# Patient Record
Sex: FEMALE | Race: ASIAN | NOT HISPANIC OR LATINO
[De-identification: names, ages, dates, MRNs, and addresses within clinical notes are randomized per-mention and may not be internally consistent; named-entity substitution may affect disease eponyms.]

---

## 2022-05-25 PROBLEM — Z00.00 ENCOUNTER FOR PREVENTIVE HEALTH EXAMINATION: Status: ACTIVE | Noted: 2022-05-25

## 2022-05-26 ENCOUNTER — APPOINTMENT (OUTPATIENT)
Dept: THORACIC SURGERY | Facility: CLINIC | Age: 62
End: 2022-05-26
Payer: MEDICAID

## 2022-05-26 ENCOUNTER — NON-APPOINTMENT (OUTPATIENT)
Age: 62
End: 2022-05-26

## 2022-05-26 VITALS
HEIGHT: 62 IN | HEART RATE: 60 BPM | BODY MASS INDEX: 21.16 KG/M2 | WEIGHT: 115 LBS | OXYGEN SATURATION: 98 % | RESPIRATION RATE: 16 BRPM | DIASTOLIC BLOOD PRESSURE: 78 MMHG | SYSTOLIC BLOOD PRESSURE: 134 MMHG

## 2022-05-26 DIAGNOSIS — Z80.0 FAMILY HISTORY OF MALIGNANT NEOPLASM OF DIGESTIVE ORGANS: ICD-10-CM

## 2022-05-26 PROCEDURE — 99205 OFFICE O/P NEW HI 60 MIN: CPT

## 2022-05-26 NOTE — HISTORY OF PRESENT ILLNESS
[FreeTextEntry1] : Ms. ALONSO HUITRON, 61 year old female, never a smoker, with no significant PMHX, who c/o middle chest pain radiated to right lateral chest since 09/2021, had CXR x2, findings were unremarkable. CT chest on 05/07/2022 showed anterior mediastinal mass. \par \par CT chest on 05/07/2022:\par - 4.0 x 2.8 x 4.1 cm (2: 60 and 6: 71) solid right anterior mediastinal soft tissue mass containing few punctate internal calcifications\par - calcified right hilar nodules present. \par - a 3 mm DOUG nodule (3: 74)\par - a 3 mm calcified granuloma in RUL (3: 26)\par - 4 mm calcified LL granuloma (3: 97)\par - a 4 mm RLL subpleural nodule (3: 104), tiny internal fat\par \par Patient is here today for CT surgery consultation, referred by Dr. Kb Solano (Pulm). Patient c/o middle chest pain radiated to right lateral chest since 09/2021, SOB on exertion, c/o vision changes. c/o ~ 10 lbs weight loss over 10 months period. Patient denies dysphagia, weakness, cough, chest pain, fever, chills, loss of appetite, weight loss, or hemoptysis.

## 2022-05-26 NOTE — ASSESSMENT
[FreeTextEntry1] : Ms. ALONSO HUITRON, 61 year old female, never a smoker, with no significant PMHX, who c/o middle chest pain radiated to right lateral chest since 09/2021, had CXR x2, findings were unremarkable. CT chest on 05/07/2022 showed anterior mediastinal mass. \par \par CT chest on 05/07/2022:\par - 4.0 x 2.8 x 4.1 cm (2: 60 and 6: 71) solid right anterior mediastinal soft tissue mass containing few punctate internal calcifications\par - calcified right hilar nodules present. \par - a 3 mm DOUG nodule (3: 74)\par - a 3 mm calcified granuloma in RUL (3: 26)\par - 4 mm calcified LL granuloma (3: 97)\par - a 4 mm RLL subpleural nodule (3: 104), tiny internal fat\par \par I have reviewed the patient's medical records and diagnostic images at time of this office consultation and have made the following recommendation:\par 1. CT chest reviewed and explained to patient, I recommended a Right VATS, RA, resection of anterior mediastinal mass on 06/21/2022. Risks and benefits and alternatives explained to patient, all questions answered, patient agreed to proceed with surgery.\par 2. CT chest w/ IV contrast\par 3. PET/CT\par 4. Obtain PFTs report from Dr. Solano. \par 5. Order Blood work to r/o MG. \par 6. Medical clearance and PST. \par \par \par I personally performed the services described in the documentation, reviewed the documentation recorded by the scribe in my presence and it accurately and completely records my words and actions.\par \par I, Sabrina Fernandez, NP, am scribing for and the presence of TAPAN Rios, the following sections HISTORY OF PRESENT ILLNESS, PAST MEDICAL/FAMILY/SOCIAL HISTORY; REVIEW OF SYSTEMS; VITAL SIGNS; PHYSICAL EXAM; DISPOSITION.

## 2022-05-26 NOTE — CONSULT LETTER
[Dear  ___] : Dear  [unfilled], [Consult Letter:] : I had the pleasure of evaluating your patient, [unfilled]. [( Thank you for referring [unfilled] for consultation for _____ )] : Thank you for referring [unfilled] for consultation for [unfilled] [Please see my note below.] : Please see my note below. [Consult Closing:] : Thank you very much for allowing me to participate in the care of this patient.  If you have any questions, please do not hesitate to contact me. [Sincerely,] : Sincerely, [FreeTextEntry2] : Dr. Kb Solano (Pulm/Ref)\par Dr. Basilia Richard (PCP) [FreeTextEntry3] : Chandler Mcqueen MD, MPH \par System Director of Thoracic Surgery \par Director of Comprehensive Lung and Foregut [place] \par Professor Cardiovascular & Thoracic Surgery \par [facility] [employer] at [facility]\par \par

## 2022-06-07 ENCOUNTER — OUTPATIENT (OUTPATIENT)
Dept: OUTPATIENT SERVICES | Facility: HOSPITAL | Age: 62
LOS: 1 days | End: 2022-06-07
Payer: COMMERCIAL

## 2022-06-07 VITALS
TEMPERATURE: 96 F | HEART RATE: 61 BPM | RESPIRATION RATE: 16 BRPM | OXYGEN SATURATION: 98 % | DIASTOLIC BLOOD PRESSURE: 85 MMHG | SYSTOLIC BLOOD PRESSURE: 134 MMHG | WEIGHT: 113.98 LBS | HEIGHT: 64 IN

## 2022-06-07 DIAGNOSIS — D49.89 NEOPLASM OF UNSPECIFIED BEHAVIOR OF OTHER SPECIFIED SITES: ICD-10-CM

## 2022-06-07 LAB
ANION GAP SERPL CALC-SCNC: 9 MMOL/L — SIGNIFICANT CHANGE UP (ref 7–14)
BLD GP AB SCN SERPL QL: NEGATIVE — SIGNIFICANT CHANGE UP
BUN SERPL-MCNC: 12 MG/DL — SIGNIFICANT CHANGE UP (ref 7–23)
CALCIUM SERPL-MCNC: 9.2 MG/DL — SIGNIFICANT CHANGE UP (ref 8.4–10.5)
CHLORIDE SERPL-SCNC: 103 MMOL/L — SIGNIFICANT CHANGE UP (ref 98–107)
CO2 SERPL-SCNC: 29 MMOL/L — SIGNIFICANT CHANGE UP (ref 22–31)
CREAT SERPL-MCNC: 0.64 MG/DL — SIGNIFICANT CHANGE UP (ref 0.5–1.3)
EGFR: 100 ML/MIN/1.73M2 — SIGNIFICANT CHANGE UP
GLUCOSE SERPL-MCNC: 91 MG/DL — SIGNIFICANT CHANGE UP (ref 70–99)
HCT VFR BLD CALC: 37.9 % — SIGNIFICANT CHANGE UP (ref 34.5–45)
HGB BLD-MCNC: 12.6 G/DL — SIGNIFICANT CHANGE UP (ref 11.5–15.5)
MCHC RBC-ENTMCNC: 31 PG — SIGNIFICANT CHANGE UP (ref 27–34)
MCHC RBC-ENTMCNC: 33.2 GM/DL — SIGNIFICANT CHANGE UP (ref 32–36)
MCV RBC AUTO: 93.3 FL — SIGNIFICANT CHANGE UP (ref 80–100)
NRBC # BLD: 0 /100 WBCS — SIGNIFICANT CHANGE UP
NRBC # FLD: 0 K/UL — SIGNIFICANT CHANGE UP
PLATELET # BLD AUTO: 215 K/UL — SIGNIFICANT CHANGE UP (ref 150–400)
POTASSIUM SERPL-MCNC: 4.2 MMOL/L — SIGNIFICANT CHANGE UP (ref 3.5–5.3)
POTASSIUM SERPL-SCNC: 4.2 MMOL/L — SIGNIFICANT CHANGE UP (ref 3.5–5.3)
RBC # BLD: 4.06 M/UL — SIGNIFICANT CHANGE UP (ref 3.8–5.2)
RBC # FLD: 11.8 % — SIGNIFICANT CHANGE UP (ref 10.3–14.5)
RH IG SCN BLD-IMP: POSITIVE — SIGNIFICANT CHANGE UP
SODIUM SERPL-SCNC: 141 MMOL/L — SIGNIFICANT CHANGE UP (ref 135–145)
WBC # BLD: 3.22 K/UL — LOW (ref 3.8–10.5)
WBC # FLD AUTO: 3.22 K/UL — LOW (ref 3.8–10.5)

## 2022-06-07 PROCEDURE — 93010 ELECTROCARDIOGRAM REPORT: CPT

## 2022-06-07 NOTE — H&P PST ADULT - NEGATIVE GENERAL SYMPTOMS
no fever/no chills/no sweating/no anorexia/no weight gain/no polyphagia/no polyuria/no polydipsia/no malaise

## 2022-06-07 NOTE — H&P PST ADULT - NSICDXFAMILYHX_GEN_ALL_CORE_FT
FAMILY HISTORY:  Father  Still living? No  FH: esophageal cancer, Age at diagnosis: Age Unknown    Mother  Still living? Unknown  FH: colon cancer, Age at diagnosis: Age Unknown

## 2022-06-07 NOTE — H&P PST ADULT - HISTORY OF PRESENT ILLNESS
61 year old female with chest pain radiated right lateral chest in september 2021. CT of chest revealed anterior mediastinal mass, now the patient presents with preop dx of neoplasm of unspecified behavior other specified sites. Patient is scheduled for robotic right video assisted thoracoscopy resection of anterior mediastinal mass.

## 2022-06-07 NOTE — H&P PST ADULT - PROBLEM SELECTOR PLAN 1
Patient tentatively scheduled for robotic right video assisted thoracoscopy resection of anterior mediastinal mass.     Pre-op instructions provided. Pt given verbal and written instructions with teach back on chlorhexidine shampoo and Pepcid. Pt verbalized understanding with return demonstration.    Pt has a scheduled preop COVID test.

## 2022-06-18 ENCOUNTER — LABORATORY RESULT (OUTPATIENT)
Age: 62
End: 2022-06-18

## 2022-06-21 ENCOUNTER — APPOINTMENT (OUTPATIENT)
Dept: THORACIC SURGERY | Facility: HOSPITAL | Age: 62
End: 2022-06-21

## 2022-06-24 PROBLEM — J98.59 OTHER DISEASES OF MEDIASTINUM, NOT ELSEWHERE CLASSIFIED: Chronic | Status: ACTIVE | Noted: 2022-06-07

## 2022-07-06 ENCOUNTER — APPOINTMENT (OUTPATIENT)
Dept: INTERVENTIONAL RADIOLOGY/VASCULAR | Facility: CLINIC | Age: 62
End: 2022-07-06

## 2022-07-06 VITALS
WEIGHT: 115 LBS | HEART RATE: 62 BPM | RESPIRATION RATE: 17 BRPM | BODY MASS INDEX: 21.16 KG/M2 | DIASTOLIC BLOOD PRESSURE: 87 MMHG | SYSTOLIC BLOOD PRESSURE: 149 MMHG | HEIGHT: 62 IN | OXYGEN SATURATION: 98 %

## 2022-07-06 LAB — SARS-COV-2 N GENE NPH QL NAA+PROBE: NOT DETECTED

## 2022-07-06 PROCEDURE — 99205 OFFICE O/P NEW HI 60 MIN: CPT

## 2022-07-08 ENCOUNTER — OUTPATIENT (OUTPATIENT)
Dept: OUTPATIENT SERVICES | Facility: HOSPITAL | Age: 62
LOS: 1 days | End: 2022-07-08

## 2022-07-08 ENCOUNTER — RESULT REVIEW (OUTPATIENT)
Age: 62
End: 2022-07-08

## 2022-07-08 DIAGNOSIS — D49.89 NEOPLASM OF UNSPECIFIED BEHAVIOR OF OTHER SPECIFIED SITES: ICD-10-CM

## 2022-07-08 PROCEDURE — 88342 IMHCHEM/IMCYTCHM 1ST ANTB: CPT | Mod: 26,59

## 2022-07-08 PROCEDURE — 88188 FLOWCYTOMETRY/READ 9-15: CPT

## 2022-07-08 PROCEDURE — 88360 TUMOR IMMUNOHISTOCHEM/MANUAL: CPT | Mod: 26

## 2022-07-08 PROCEDURE — 88305 TISSUE EXAM BY PATHOLOGIST: CPT | Mod: 26

## 2022-07-08 PROCEDURE — 88341 IMHCHEM/IMCYTCHM EA ADD ANTB: CPT | Mod: 26,59

## 2022-07-08 PROCEDURE — 32408 CORE NDL BX LNG/MED PERQ: CPT

## 2022-07-08 PROCEDURE — 88173 CYTOPATH EVAL FNA REPORT: CPT | Mod: 26

## 2022-07-08 RX ORDER — IBUPROFEN 200 MG
600 TABLET ORAL ONCE
Refills: 0 | Status: COMPLETED | OUTPATIENT
Start: 2022-07-08 | End: 2022-07-08

## 2022-07-08 RX ADMIN — Medication 600 MILLIGRAM(S): at 10:28

## 2022-07-08 NOTE — PROCEDURE NOTE - PROCEDURE FINDINGS AND DETAILS
- 4 core biopsies obtained and one FNA, samples given to cytopathology and adequacy confirmed.  - No immediate complications.

## 2022-07-11 LAB — NON-GYNECOLOGICAL CYTOLOGY STUDY: SIGNIFICANT CHANGE UP

## 2022-07-12 LAB — TM INTERPRETATION: SIGNIFICANT CHANGE UP

## 2022-07-13 DIAGNOSIS — J98.59 OTHER DISEASES OF MEDIASTINUM, NOT ELSEWHERE CLASSIFIED: ICD-10-CM

## 2022-07-13 DIAGNOSIS — D49.89 NEOPLASM OF UNSPECIFIED BEHAVIOR OF OTHER SPECIFIED SITES: ICD-10-CM

## 2022-07-14 ENCOUNTER — APPOINTMENT (OUTPATIENT)
Dept: THORACIC SURGERY | Facility: CLINIC | Age: 62
End: 2022-07-14

## 2022-07-14 VITALS
HEIGHT: 62 IN | RESPIRATION RATE: 17 BRPM | BODY MASS INDEX: 21.16 KG/M2 | WEIGHT: 115 LBS | OXYGEN SATURATION: 95 % | SYSTOLIC BLOOD PRESSURE: 171 MMHG | DIASTOLIC BLOOD PRESSURE: 96 MMHG | HEART RATE: 68 BPM

## 2022-07-14 PROCEDURE — 99215 OFFICE O/P EST HI 40 MIN: CPT

## 2022-07-21 NOTE — DATA REVIEWED
[FreeTextEntry1] : I have independently reviewed the following:\par PET/CT on 6/13/22\par CT Chest w/IV contrast on 6/13/22\par FNA of anterior mediastinal mass on 7/8/22

## 2022-07-21 NOTE — CONSULT LETTER
[Dear  ___] : Dear  [unfilled], [Consult Letter:] : I had the pleasure of evaluating your patient, [unfilled]. [( Thank you for referring [unfilled] for consultation for _____ )] : Thank you for referring [unfilled] for consultation for [unfilled] [Please see my note below.] : Please see my note below. [Consult Closing:] : Thank you very much for allowing me to participate in the care of this patient.  If you have any questions, please do not hesitate to contact me. [Sincerely,] : Sincerely, [FreeTextEntry3] : Chandler Mcqueen MD, MPH \par System Director of Thoracic Surgery \par Director of Comprehensive Lung and Foregut [place] \par Professor Cardiovascular & Thoracic Surgery \par [facility] [employer] at [facility]\par \par  [FreeTextEntry2] : Dr. Kb Solano (Pulm/Ref)\par Dr. Haley Richard (PCP)

## 2022-07-21 NOTE — ASSESSMENT
[FreeTextEntry1] : Ms. ALONSO HUITRON, 62 year old female, never a smoker, with no significant PMHX, who c/o middle chest pain radiated to right lateral chest since 09/2021, had CXR x2, findings were unremarkable. CT chest on 05/07/2022 showed anterior mediastinal mass. \par \par CT chest on 05/07/2022:\par - 4.0 x 2.8 x 4.1 cm (2: 60 and 6: 71) solid right anterior mediastinal soft tissue mass containing few punctate internal calcifications\par - calcified right hilar nodules present. \par - a 3 mm DOUG nodule (3: 74)\par - a 3 mm calcified granuloma in RUL (3: 26)\par - 4 mm calcified LL granuloma (3: 97)\par - a 4 mm RLL subpleural nodule (3: 104), tiny internal fat\par \par PET/CT on 6/13/22:\par - large active mass in the anterior mediastinum 5.1 x 3.2 cm, SUV=12.7, activity in the adjacent aspect of the sternum, SUV=6.7\par \par CT Chest w/IV contrast on 6/13/22:\par - anterior inferior mediastinum is a solid lobulated mass, 5.6 x 4.1 x 2.6 cm, the mass indents the anterior aspect of the Rt atrium\par - subpleural 4 mm solid nodule in RLL (3:106)\par - stable 3 and 4 mm nodules abutting minor fissure (3:89, 94)\par \par Now s/p FNA of anterior mediastinal mass on 7/8/22. Path revealed positive for malignant cells, carcinoma, favor high grade with marked apoptosis, +P40, CD%, [id number] and elevated Ki67 index. Negative for CK20, TTF-1, Synaptophysin, Chromogranin and CD56. 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 \par \par I have reviewed the patient's medical records and diagnostic images at time of this office consultation and have made the following recommendation:\par 1. Path revealed with pt, not a surgical candidate at this time. Refer to Dr. Joe Barraza for chemotherapy\par 2. Tumor Board \par \par \par I personally performed the services described in the documentation, reviewed the documentation recorded by the scribe in my presence and it accurately and completely records my words and actions. \par \par I, Daniella Fuentes NP, am scribing for and the presence of TAPAN Rios, the following sections HISTORY OF PRESENT ILLNESS, PAST MEDICAL/FAMILY/SOCIAL HISTORY; REVIEW OF SYSTEMS; VITAL SIGNS; PHYSICAL EXAM; DISPOSITION.\par

## 2022-07-21 NOTE — HISTORY OF PRESENT ILLNESS
[FreeTextEntry1] : Ms. ALONSO HUITRON, 62 year old female, never a smoker, with no significant PMHX, who c/o middle chest pain radiated to right lateral chest since 09/2021, had CXR x2, findings were unremarkable. CT chest on 05/07/2022 showed anterior mediastinal mass. \par \par CT chest on 05/07/2022:\par - 4.0 x 2.8 x 4.1 cm (2: 60 and 6: 71) solid right anterior mediastinal soft tissue mass containing few punctate internal calcifications\par - calcified right hilar nodules present. \par - a 3 mm DOUG nodule (3: 74)\par - a 3 mm calcified granuloma in RUL (3: 26)\par - 4 mm calcified LL granuloma (3: 97)\par - a 4 mm RLL subpleural nodule (3: 104), tiny internal fat\par \par PET/CT on 6/13/22:\par - large active mass in the anterior mediastinum 5.1 x 3.2 cm, SUV=12.7, activity in the adjacent aspect of the sternum, SUV=6.7\par \par CT Chest w/IV contrast on 6/13/22:\par - anterior inferior mediastinum is a solid lobulated mass, 5.6 x 4.1 x 2.6 cm, the mass indents the anterior aspect of the Rt atrium\par - subpleural 4 mm solid nodule in RLL (3:106)\par - stable 3 and 4 mm nodules abutting minor fissure (3:89, 94)\par \par Now s/p FNA of anterior mediastinal mass on 7/8/22. Path revealed positive for malignant cells, carcinoma, favor high grade with marked apoptosis, pending confirmatory immunostains.\par \par Pt presents today for follow up.\par \par

## 2022-10-13 ENCOUNTER — APPOINTMENT (OUTPATIENT)
Dept: THORACIC SURGERY | Facility: CLINIC | Age: 62
End: 2022-10-13

## 2022-10-13 VITALS
WEIGHT: 128 LBS | BODY MASS INDEX: 23.41 KG/M2 | OXYGEN SATURATION: 98 % | RESPIRATION RATE: 17 BRPM | SYSTOLIC BLOOD PRESSURE: 109 MMHG | HEART RATE: 86 BPM | TEMPERATURE: 98.2 F | DIASTOLIC BLOOD PRESSURE: 74 MMHG

## 2022-10-13 PROCEDURE — 99214 OFFICE O/P EST MOD 30 MIN: CPT

## 2022-10-14 NOTE — HISTORY OF PRESENT ILLNESS
[FreeTextEntry1] : Ms. ALONSO HUITRON, 62 year old female, never a smoker, with no significant PMHX, who c/o middle chest pain radiated to right lateral chest since 09/2021, had CXR x2, findings were unremarkable. CT chest on 05/07/2022 showed anterior mediastinal mass. \par \par CT chest on 05/07/2022:\par - 4.0 x 2.8 x 4.1 cm (2: 60 and 6: 71) solid right anterior mediastinal soft tissue mass containing few punctate internal calcifications\par - calcified right hilar nodules present. \par - a 3 mm DOUG nodule (3: 74)\par - a 3 mm calcified granuloma in RUL (3: 26)\par - 4 mm calcified LL granuloma (3: 97)\par - a 4 mm RLL subpleural nodule (3: 104), tiny internal fat\par \par PET/CT on 6/13/22:\par - large active mass in the anterior mediastinum 5.1 x 3.2 cm, SUV=12.7, activity in the adjacent aspect of the sternum, SUV=6.7\par \par CT Chest w/IV contrast on 6/13/22:\par - anterior inferior mediastinum is a solid lobulated mass, 5.6 x 4.1 x 2.6 cm, the mass indents the anterior aspect of the Rt atrium\par - subpleural 4 mm solid nodule in RLL (3:106)\par - stable 3 and 4 mm nodules abutting minor fissure (3:89, 94)\par \par Now s/p FNA of anterior mediastinal mass on 7/8/22. Path revealed positive for malignant cells, carcinoma, favor high grade with marked apoptosis, pending confirmatory immunostains.\par \par Referred to Dr. Joe Barraza for chemotherapy, completed 8 cycles of chemo, last dose 9/20/22\par \par CT C/A/P w/ contrast on 10/5/22:\par - decreased in size 3.2 x 2.1 x 3.9cm anterior mediastinal mass abutting the pericardium (2:59; previously 4.2 x 2.6 x 5.6cm)\par - stable 1cm Rt hilar LN\par - few subcentimeter mediastinal LNs\par - 5mm RLL nodule (3:101); small 4mm DOUG nodule (3:74); small 4mm RML nodule (3:92)\par - small 3mm calcified granuloma in the RUL\par \par Pt presents today for follow up.\par \par

## 2022-10-14 NOTE — ASSESSMENT
[FreeTextEntry1] : Ms. ALONSO HUITRON, 62 year old female, never a smoker, with hx of malignant mediastinal mass.\par \par CT C/A/P w/ contrast on 10/5/22:\par - decreased in size 3.2 x 2.1 x 3.9cm anterior mediastinal mass abutting the pericardium (2:59; previously 4.2 x 2.6 x 5.6cm)\par - stable 1cm Rt hilar LN\par - few subcentimeter mediastinal LNs\par - 5mm RLL nodule (3:101); small 4mm DOUG nodule (3:74); small 4mm RML nodule (3:92)\par - small 3mm calcified granuloma in the RUL\par \par I have reviewed the patient's medical records and diagnostic images at time of this office consultation and have made the following recommendation:\par 1. CT reviewed with pt, I would like to order a PET/CT for re-staging. \par 2. PFTs for pre-op planning\par 3. RTC after all above tests\par \par \par I personally performed the services described in the documentation, reviewed the documentation recorded by the scribe in my presence and it accurately and completely records my words and actions. \par \par I, Daniella Fuentes NP, am scribing for and the presence of TAPAN Rios, the following sections HISTORY OF PRESENT ILLNESS, PAST MEDICAL/FAMILY/SOCIAL HISTORY; REVIEW OF SYSTEMS; VITAL SIGNS; PHYSICAL EXAM; DISPOSITION.\par \par

## 2022-10-14 NOTE — CONSULT LETTER
[Dear  ___] : Dear  [unfilled], [Consult Letter:] : I had the pleasure of evaluating your patient, [unfilled]. [( Thank you for referring [unfilled] for consultation for _____ )] : Thank you for referring [unfilled] for consultation for [unfilled] [Please see my note below.] : Please see my note below. [Consult Closing:] : Thank you very much for allowing me to participate in the care of this patient.  If you have any questions, please do not hesitate to contact me. [Sincerely,] : Sincerely, [FreeTextEntry2] : Dr. Kb Solano (Pulm/Ref)\par Dr. Haley Richard (PCP) [FreeTextEntry3] : Chandler Mcqueen MD, MPH \par System Director of Thoracic Surgery \par Director of Comprehensive Lung and Foregut [place] \par Professor Cardiovascular & Thoracic Surgery \par [facility] [employer] at [facility]\par \par

## 2022-10-14 NOTE — DATA REVIEWED
[FreeTextEntry1] : I have independently reviewed the following:\par CT C/A/P w/ contrast on 10/5/22\par

## 2022-11-03 ENCOUNTER — APPOINTMENT (OUTPATIENT)
Dept: THORACIC SURGERY | Facility: CLINIC | Age: 62
End: 2022-11-03

## 2022-11-03 VITALS
SYSTOLIC BLOOD PRESSURE: 114 MMHG | BODY MASS INDEX: 23.41 KG/M2 | WEIGHT: 128 LBS | HEART RATE: 81 BPM | DIASTOLIC BLOOD PRESSURE: 79 MMHG | RESPIRATION RATE: 18 BRPM | TEMPERATURE: 97.8 F | OXYGEN SATURATION: 96 %

## 2022-11-03 PROCEDURE — 99215 OFFICE O/P EST HI 40 MIN: CPT

## 2022-11-03 RX ORDER — METOCLOPRAMIDE 10 MG/1
10 TABLET ORAL
Qty: 30 | Refills: 0 | Status: ACTIVE | COMMUNITY
Start: 2022-10-23

## 2022-11-03 RX ORDER — OMEPRAZOLE 20 MG/1
20 TABLET, DELAYED RELEASE ORAL
Qty: 30 | Refills: 0 | Status: ACTIVE | COMMUNITY
Start: 2022-10-21

## 2022-11-07 NOTE — ASSESSMENT
[FreeTextEntry1] : Ms. ALONSO HUITRON, 62 year old female, never a smoker, with hx of malignant mediastinal mass.\par \par CT C/A/P w/ contrast on 10/5/22:\par - decreased in size 3.2 x 2.1 x 3.9cm anterior mediastinal mass abutting the pericardium (2:59; previously 4.2 x 2.6 x 5.6cm)\par - stable 1cm Rt hilar LN\par - few subcentimeter mediastinal LNs\par - 5mm RLL nodule (3:101); small 4mm DOUG nodule (3:74); small 4mm RML nodule (3:92)\par - small 3mm calcified granuloma in the RUL\par \par PFTs on 10/15/22: FVC 98%, FEV1 96%, DLCO 77%.\par \par PET/CT on 10/27/22 @ [facility]:\par - decreased in size and activity of the anterior mediastinal mass, measuring 3.2 x 2.1cm SUV=3 now (image 63), previously 5.2 x 3.2cm SUV=12.7, contact the Rt pleural space as well as the anterior pericardium w/ slight mild mass effect on the Rt atrium\par - decreased activity w/in the adjacent sternum, now w/ SUV=3.6 (image 64), previously SUV=6.7\par - bilateral hilar LNs measuring up to 1cm in the Rt with SUV=5 (image 57; previously SUV=5.1)\par - several lung nodules bilaterally, measuring up to 5mm in the RML (image 65); 5mm in the RLL (image 66)\par - a new 6mm RUL ggo (image 51)\par \par I have reviewed the patient's medical records and diagnostic images at time of this office consultation and have made the following recommendation:\par 1. CT and PET scans reviewed, I recommended a Flex Bronch, EBUS Bx, if hilar LNs negative for mets, will then proceed with surgical rxn.\par 2. Consult with Dr. Jj Roger for possible joint case: Resection of anterior mediastinal mass, rxn of sternum and reconstruction.\par \par \par I personally performed the services described in the documentation, reviewed the documentation recorded by the scribe in my presence and it accurately and completely records my words and actions.\par \par I, Hannah Villareal NP, am scribing for and the presence of TAPAN Rios, the following sections HISTORY OF PRESENT ILLNESS, PAST MEDICAL/FAMILY/SOCIAL HISTORY; REVIEW OF SYSTEMS; VITAL SIGNS; PHYSICAL EXAM; DISPOSITION.\par \par \par \par

## 2022-11-07 NOTE — HISTORY OF PRESENT ILLNESS
[FreeTextEntry1] : Ms. ALONSO HUITRON, 62 year old female, never a smoker, with no significant PMHX, who c/o middle chest pain radiated to right lateral chest since 09/2021, had CXR x2, findings were unremarkable. CT chest on 05/07/2022 showed anterior mediastinal mass. \par \par CT chest on 05/07/2022:\par - 4.0 x 2.8 x 4.1 cm (2: 60 and 6: 71) solid right anterior mediastinal soft tissue mass containing few punctate internal calcifications\par - calcified right hilar nodules present. \par - a 3 mm DOUG nodule (3: 74)\par - a 3 mm calcified granuloma in RUL (3: 26)\par - 4 mm calcified LL granuloma (3: 97)\par - a 4 mm RLL subpleural nodule (3: 104), tiny internal fat\par \par PET/CT on 6/13/22:\par - large active mass in the anterior mediastinum 5.1 x 3.2 cm, SUV=12.7, activity in the adjacent aspect of the sternum, SUV=6.7\par \par CT Chest w/IV contrast on 6/13/22:\par - anterior inferior mediastinum is a solid lobulated mass, 5.6 x 4.1 x 2.6 cm, the mass indents the anterior aspect of the Rt atrium\par - subpleural 4 mm solid nodule in RLL (3:106)\par - stable 3 and 4 mm nodules abutting minor fissure (3:89, 94)\par \par Now s/p FNA of anterior mediastinal mass on 7/8/22. Path revealed positive for malignant cells, carcinoma, favor high grade with marked apoptosis, pending confirmatory immunostains.\par \par Referred to Dr. Joe Barraza for chemotherapy, completed 8 cycles of chemo, last dose 9/20/22\par \par CT C/A/P w/ contrast on 10/5/22:\par - decreased in size 3.2 x 2.1 x 3.9cm anterior mediastinal mass abutting the pericardium (2:59; previously 4.2 x 2.6 x 5.6cm)\par - stable 1cm Rt hilar LN\par - few subcentimeter mediastinal LNs\par - 5mm RLL nodule (3:101); small 4mm DOUG nodule (3:74); small 4mm RML nodule (3:92)\par - small 3mm calcified granuloma in the RUL\par \par PFTs on 10/15/22: FVC 98%, FEV1 96%, DLCO 77%.\par \par PET/CT on 10/27/22 @ [facility]:\par - decreased in size and activity of the anterior mediastinal mass, measuring 3.2 x 2.1cm SUV=3 now (image 63), previously 5.2 x 3.2cm SUV=12.7, contact the Rt pleural space as well as the anterior pericardium w/ slight mild mass effect on the Rt atrium\par - decreased activity w/in the adjacent sternum, now w/ SUV=3.6 (image 64), previously SUV=6.7\par - bilateral hilar LNs measuring up to 1cm in the Rt with SUV=5 (image 57; previously SUV=5.1)\par - several lung nodules bilaterally, measuring up to 5mm in the RML (image 65); 5mm in the RLL (image 66)\par - a new 6mm RUL ggo (image 51)\par \par Pt presents today for follow up.\par

## 2022-11-07 NOTE — DATA REVIEWED
[FreeTextEntry1] : I have independently reviewed the following:\par PET/CT on 10/27/22 @ [facility]\par PFTs on 10/15/22: FVC 98%, FEV1 96%, DLCO 77%.

## 2022-11-07 NOTE — CONSULT LETTER
[Dear  ___] : Dear  [unfilled], [Consult Letter:] : I had the pleasure of evaluating your patient, [unfilled]. [( Thank you for referring [unfilled] for consultation for _____ )] : Thank you for referring [unfilled] for consultation for [unfilled] [Please see my note below.] : Please see my note below. [Consult Closing:] : Thank you very much for allowing me to participate in the care of this patient.  If you have any questions, please do not hesitate to contact me. [Sincerely,] : Sincerely, [FreeTextEntry2] : Dr. Kb Solano (Pulm/Ref)\par Dr. Haley Richard (PCP) [FreeTextEntry3] : Chandler Mcqueen MD, MPH \par System Director of Thoracic Surgery \par Director of Comprehensive Lung and Foregut [place] \par Professor Cardiovascular & Thoracic Surgery \par [facility] [employer] at [facility]\par \par

## 2022-11-08 ENCOUNTER — OUTPATIENT (OUTPATIENT)
Dept: OUTPATIENT SERVICES | Facility: HOSPITAL | Age: 62
LOS: 1 days | End: 2022-11-08

## 2022-11-08 VITALS
HEIGHT: 64 IN | RESPIRATION RATE: 16 BRPM | TEMPERATURE: 97 F | DIASTOLIC BLOOD PRESSURE: 80 MMHG | WEIGHT: 123.9 LBS | SYSTOLIC BLOOD PRESSURE: 130 MMHG | OXYGEN SATURATION: 100 % | HEART RATE: 80 BPM

## 2022-11-08 DIAGNOSIS — D48.9 NEOPLASM OF UNCERTAIN BEHAVIOR, UNSPECIFIED: ICD-10-CM

## 2022-11-08 DIAGNOSIS — D49.89 NEOPLASM OF UNSPECIFIED BEHAVIOR OF OTHER SPECIFIED SITES: ICD-10-CM

## 2022-11-08 DIAGNOSIS — I10 ESSENTIAL (PRIMARY) HYPERTENSION: ICD-10-CM

## 2022-11-08 LAB
ALBUMIN SERPL ELPH-MCNC: 4.6 G/DL — SIGNIFICANT CHANGE UP (ref 3.3–5)
ALP SERPL-CCNC: 67 U/L — SIGNIFICANT CHANGE UP (ref 40–120)
ALT FLD-CCNC: 11 U/L — SIGNIFICANT CHANGE UP (ref 4–33)
ANION GAP SERPL CALC-SCNC: 11 MMOL/L — SIGNIFICANT CHANGE UP (ref 7–14)
AST SERPL-CCNC: 24 U/L — SIGNIFICANT CHANGE UP (ref 4–32)
BILIRUB SERPL-MCNC: 0.5 MG/DL — SIGNIFICANT CHANGE UP (ref 0.2–1.2)
BUN SERPL-MCNC: 16 MG/DL — SIGNIFICANT CHANGE UP (ref 7–23)
CALCIUM SERPL-MCNC: 9.5 MG/DL — SIGNIFICANT CHANGE UP (ref 8.4–10.5)
CHLORIDE SERPL-SCNC: 103 MMOL/L — SIGNIFICANT CHANGE UP (ref 98–107)
CO2 SERPL-SCNC: 29 MMOL/L — SIGNIFICANT CHANGE UP (ref 22–31)
CREAT SERPL-MCNC: 0.58 MG/DL — SIGNIFICANT CHANGE UP (ref 0.5–1.3)
EGFR: 102 ML/MIN/1.73M2 — SIGNIFICANT CHANGE UP
GLUCOSE SERPL-MCNC: 90 MG/DL — SIGNIFICANT CHANGE UP (ref 70–99)
HCT VFR BLD CALC: 37.1 % — SIGNIFICANT CHANGE UP (ref 34.5–45)
HGB BLD-MCNC: 12.4 G/DL — SIGNIFICANT CHANGE UP (ref 11.5–15.5)
MCHC RBC-ENTMCNC: 33.4 GM/DL — SIGNIFICANT CHANGE UP (ref 32–36)
MCHC RBC-ENTMCNC: 34.3 PG — HIGH (ref 27–34)
MCV RBC AUTO: 102.8 FL — HIGH (ref 80–100)
NRBC # BLD: 0 /100 WBCS — SIGNIFICANT CHANGE UP (ref 0–0)
NRBC # FLD: 0 K/UL — SIGNIFICANT CHANGE UP (ref 0–0)
PLATELET # BLD AUTO: 243 K/UL — SIGNIFICANT CHANGE UP (ref 150–400)
POTASSIUM SERPL-MCNC: 3.9 MMOL/L — SIGNIFICANT CHANGE UP (ref 3.5–5.3)
POTASSIUM SERPL-SCNC: 3.9 MMOL/L — SIGNIFICANT CHANGE UP (ref 3.5–5.3)
PROT SERPL-MCNC: 7.9 G/DL — SIGNIFICANT CHANGE UP (ref 6–8.3)
RBC # BLD: 3.61 M/UL — LOW (ref 3.8–5.2)
RBC # FLD: 13.9 % — SIGNIFICANT CHANGE UP (ref 10.3–14.5)
SODIUM SERPL-SCNC: 143 MMOL/L — SIGNIFICANT CHANGE UP (ref 135–145)
WBC # BLD: 3.26 K/UL — LOW (ref 3.8–10.5)
WBC # FLD AUTO: 3.26 K/UL — LOW (ref 3.8–10.5)

## 2022-11-08 PROCEDURE — 93010 ELECTROCARDIOGRAM REPORT: CPT

## 2022-11-08 RX ORDER — SODIUM CHLORIDE 9 MG/ML
1000 INJECTION, SOLUTION INTRAVENOUS
Refills: 0 | Status: DISCONTINUED | OUTPATIENT
Start: 2022-11-29 | End: 2022-12-13

## 2022-11-08 NOTE — H&P PST ADULT - PROBLEM SELECTOR PLAN 1
Flexible Bronchoscopy , Endobronchial Ultrasound  Biopsy with Cytology     Pre op instructions reviewed with pt and daughter in [place] ; both appear to have a good understanding of pre op instructions    Pt and daughter in law aware pre op Covid testing

## 2022-11-08 NOTE — H&P PST ADULT - HISTORY OF PRESENT ILLNESS
Pt is a 62 y.o. female ;pt speaks Mandarin ;  information obtained with assistance of [employer] ; Marcela #[id number] and sister in law [place]  Pt reports h/o Thymus cancer ; tx with chemo / RT completed 9/22 Pt f/u with surgeon ; pt now presents for Flexible Bronchoscopy Endobronchial ultrasound  with Cytology . Pt is a 62 y.o. female ;pt speaks Mandarin ;  information obtained with assistance of [employer] ; Marcela #[id number] and daughter  in law [place]  Pt reports h/o Thymic  cancer ; tx with chemo / RT completed 9/22 Pt f/u with surgeon ; pt now presents for Flexible Bronchoscopy Endobronchial ultrasound  with Cytology .

## 2022-11-08 NOTE — H&P PST ADULT - ENDOCRINE COMMENTS
Called and notified pharmacy medication was approved. Stated they could not get the quantity to go through. Faxed the approval letter to the pharmacy.     Medication was approved. Topiramate 200mg qty 120 for 30 days.    pt denies diabetes

## 2022-11-08 NOTE — H&P PST ADULT - NSICDXPASTMEDICALHX_GEN_ALL_CORE_FT
PAST MEDICAL HISTORY:  Mediastinal mass      PAST MEDICAL HISTORY:  Mediastinal mass     Thymic cancer

## 2022-11-08 NOTE — H&P PST ADULT - HEARING LOSS
LM for patient regarding missed cysto/sp tube change today    Need to reschedule and make sure that patient's sp tube is being changed accordingly
OV scheduled
L

## 2022-11-12 ENCOUNTER — LABORATORY RESULT (OUTPATIENT)
Age: 62
End: 2022-11-12

## 2022-11-28 ENCOUNTER — TRANSCRIPTION ENCOUNTER (OUTPATIENT)
Age: 62
End: 2022-11-28

## 2022-11-28 NOTE — ASU PATIENT PROFILE, ADULT - FALL HARM RISK - UNIVERSAL INTERVENTIONS
Bed in lowest position, wheels locked, appropriate side rails in place/Call bell, personal items and telephone in reach/Instruct patient to call for assistance before getting out of bed or chair/Non-slip footwear when patient is out of bed/[place] to call system/Physically safe environment - no spills, clutter or unnecessary equipment/Purposeful Proactive Rounding/Room/bathroom lighting operational, light cord in reach

## 2022-11-28 NOTE — ASU PATIENT PROFILE, ADULT - VISION (WITH CORRECTIVE LENSES IF THE PATIENT USUALLY WEARS THEM):
[Negative] : Psychiatric Normal vision: sees adequately in most situations; can see medication labels, newsprint

## 2022-11-29 ENCOUNTER — RESULT REVIEW (OUTPATIENT)
Age: 62
End: 2022-11-29

## 2022-11-29 ENCOUNTER — APPOINTMENT (OUTPATIENT)
Dept: THORACIC SURGERY | Facility: HOSPITAL | Age: 62
End: 2022-11-29

## 2022-11-29 ENCOUNTER — TRANSCRIPTION ENCOUNTER (OUTPATIENT)
Age: 62
End: 2022-11-29

## 2022-11-29 ENCOUNTER — OUTPATIENT (OUTPATIENT)
Dept: OUTPATIENT SERVICES | Facility: HOSPITAL | Age: 62
LOS: 1 days | Discharge: HOME | End: 2022-11-29

## 2022-11-29 VITALS
DIASTOLIC BLOOD PRESSURE: 78 MMHG | RESPIRATION RATE: 16 BRPM | OXYGEN SATURATION: 100 % | HEART RATE: 75 BPM | SYSTOLIC BLOOD PRESSURE: 127 MMHG | HEIGHT: 64 IN | TEMPERATURE: 98 F | WEIGHT: 123.9 LBS

## 2022-11-29 VITALS
HEART RATE: 87 BPM | RESPIRATION RATE: 18 BRPM | SYSTOLIC BLOOD PRESSURE: 125 MMHG | OXYGEN SATURATION: 100 % | DIASTOLIC BLOOD PRESSURE: 75 MMHG

## 2022-11-29 DIAGNOSIS — D49.89 NEOPLASM OF UNSPECIFIED BEHAVIOR OF OTHER SPECIFIED SITES: ICD-10-CM

## 2022-11-29 PROCEDURE — 71045 X-RAY EXAM CHEST 1 VIEW: CPT | Mod: 26

## 2022-11-29 PROCEDURE — 88305 TISSUE EXAM BY PATHOLOGIST: CPT | Mod: 26

## 2022-11-29 PROCEDURE — 88173 CYTOPATH EVAL FNA REPORT: CPT | Mod: 26

## 2022-11-29 PROCEDURE — 31629 BRONCHOSCOPY/NEEDLE BX EACH: CPT

## 2022-11-29 PROCEDURE — 31633 BRONCHOSCOPY/NEEDLE BX ADDL: CPT

## 2022-11-29 PROCEDURE — 31645 BRNCHSC W/THER ASPIR 1ST: CPT

## 2022-11-29 PROCEDURE — 31652 BRONCH EBUS SAMPLNG 1/2 NODE: CPT

## 2022-11-29 PROCEDURE — 88172 CYTP DX EVAL FNA 1ST EA SITE: CPT | Mod: 26

## 2022-11-29 RX ORDER — ACETAMINOPHEN 500 MG
1000 TABLET ORAL ONCE
Refills: 0 | Status: COMPLETED | OUTPATIENT
Start: 2022-11-29 | End: 2022-11-29

## 2022-11-29 RX ADMIN — Medication 400 MILLIGRAM(S): at 15:50

## 2022-11-29 RX ADMIN — Medication 1000 MILLIGRAM(S): at 16:05

## 2022-11-29 NOTE — BRIEF OPERATIVE NOTE - NSICDXBRIEFPROCEDURE_GEN_ALL_CORE_FT
PROCEDURES:  Bronchoscopy, flexible, adult 29-Nov-2022 15:27:27  Nancy Beltran  EBUS, with biopsy of mediastinal lymph node 29-Nov-2022 15:28:56  Nancy Beltran

## 2022-11-29 NOTE — ASU DISCHARGE PLAN (ADULT/PEDIATRIC) - CALL YOUR DOCTOR IF YOU HAVE ANY OF THE FOLLOWING:
Swelling that gets worse/Nausea and vomiting that does not stop/Inability to tolerate liquids or foods

## 2022-11-29 NOTE — ASU DISCHARGE PLAN (ADULT/PEDIATRIC) - CARE PROVIDER_API CALL
Chandler Mcqueen (MD)  Surgery; Thoracic Surgery  [phone] [place] [address]  Phone: [phone]  Fax: [phone]  Follow Up Time: 2 weeks

## 2022-11-29 NOTE — BRIEF OPERATIVE NOTE - OPERATION/FINDINGS
Flexible bronchoscopy with EBUS and biopsy of LN 4R, 7 and 11. Non diagnostic findings with intraop cytology.

## 2022-11-29 NOTE — ASU DISCHARGE PLAN (ADULT/PEDIATRIC) - NURSING INSTRUCTIONS
DO NOT take any Tylenol (Acetaminophen) or narcotics containing Tylenol until after  9:45 pm . You received Tylenol during your operation and it can cause damage to your liver if too much is taken within a 24 hour time period.

## 2022-11-29 NOTE — ASU DISCHARGE PLAN (ADULT/PEDIATRIC) - NS MD DC FALL RISK RISK
For information on Fall & Injury Prevention, visit: [email or web address] OR  [email or web address] OR  https://www.cdc.gov/steadi/patient.html

## 2022-12-02 LAB — NON-GYNECOLOGICAL CYTOLOGY STUDY: SIGNIFICANT CHANGE UP

## 2022-12-07 PROBLEM — C37 MALIGNANT NEOPLASM OF THYMUS: Chronic | Status: ACTIVE | Noted: 2022-11-08

## 2022-12-08 ENCOUNTER — APPOINTMENT (OUTPATIENT)
Dept: THORACIC SURGERY | Facility: CLINIC | Age: 62
End: 2022-12-08

## 2022-12-08 VITALS
SYSTOLIC BLOOD PRESSURE: 113 MMHG | WEIGHT: 131 LBS | HEART RATE: 64 BPM | DIASTOLIC BLOOD PRESSURE: 77 MMHG | RESPIRATION RATE: 18 BRPM | TEMPERATURE: 97.8 F | BODY MASS INDEX: 23.96 KG/M2 | OXYGEN SATURATION: 97 %

## 2022-12-08 DIAGNOSIS — R91.8 OTHER NONSPECIFIC ABNORMAL FINDING OF LUNG FIELD: ICD-10-CM

## 2022-12-08 PROCEDURE — 99214 OFFICE O/P EST MOD 30 MIN: CPT

## 2022-12-08 NOTE — PHYSICAL EXAM
[Sclera] : the sclera and conjunctiva were normal [PERRL With Normal Accommodation] : pupils were equal in size, round, and reactive to light [Neck Appearance] : the appearance of the neck was normal [Neck Cervical Mass (___cm)] : no neck mass was observed [Respiration, Rhythm And Depth] : normal respiratory rhythm and effort [Auscultation Breath Sounds / Voice Sounds] : lungs were clear to auscultation bilaterally [Apical Impulse] : the apical impulse was normal [Heart Rate And Rhythm] : heart rate was normal and rhythm regular [Heart Sounds] : normal S1 and S2 [Examination Of The Chest] : the chest was normal in appearance [Chest Visual Inspection Thoracic Asymmetry] : no chest asymmetry [2+] : left 2+ [No Abnormalities] : the abdominal aorta was not enlarged and no bruit was heard [No Pulse Delay] : no pulse delay [Bowel Sounds] : normal bowel sounds [Abdomen Soft] : soft [Abdomen Tenderness] : non-tender [No CVA Tenderness] : no ~M costovertebral angle tenderness [No Spinal Tenderness] : no spinal tenderness [Abnormal Walk] : normal gait [Involuntary Movements] : no involuntary movements were seen [Skin Color & Pigmentation] : normal skin color and pigmentation [Skin Turgor] : normal skin turgor [] : no rash [Sensation] : the sensory exam was normal to light touch and pinprick [No Focal Deficits] : no focal deficits [Oriented To Time, Place, And Person] : oriented to person, place, and time [Affect] : the affect was normal [Mood] : the mood was normal

## 2022-12-09 NOTE — HISTORY OF PRESENT ILLNESS
[FreeTextEntry1] : Ms. ALONSO HUITRON, 62 year old female, never a smoker, with no significant PMHX, who c/o middle chest pain radiated to right lateral chest since 09/2021, had CXR x2, findings were unremarkable. CT chest on 05/07/2022 showed anterior mediastinal mass. \par \par CT chest on 05/07/2022:\par - 4.0 x 2.8 x 4.1 cm (2: 60 and 6: 71) solid right anterior mediastinal soft tissue mass containing few punctate internal calcifications\par - calcified right hilar nodules present. \par - a 3 mm DOUG nodule (3: 74)\par - a 3 mm calcified granuloma in RUL (3: 26)\par - 4 mm calcified LL granuloma (3: 97)\par - a 4 mm RLL subpleural nodule (3: 104), tiny internal fat\par \par PET/CT on 6/13/22:\par - large active mass in the anterior mediastinum 5.1 x 3.2 cm, SUV=12.7, activity in the adjacent aspect of the sternum, SUV=6.7\par \par CT Chest w/IV contrast on 6/13/22:\par - anterior inferior mediastinum is a solid lobulated mass, 5.6 x 4.1 x 2.6 cm, the mass indents the anterior aspect of the Rt atrium\par - subpleural 4 mm solid nodule in RLL (3:106)\par - stable 3 and 4 mm nodules abutting minor fissure (3:89, 94)\par \par Now s/p FNA of anterior mediastinal mass on 7/8/22. Path revealed positive for malignant cells, carcinoma, favor high grade with marked apoptosis. Immunostains: Positive for P40, CD5, [id number] and show an elevated\par Ki67 index. 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 Joe Barraza for chemotherapy, completed 8 cycles of chemo, last dose 9/20/22. She has completed neoadjuvant RT with Dr. Conner Walker in 9/2022 at [facility]. \par \par CT C/A/P w/ contrast on 10/5/22:\par - decreased in size 3.2 x 2.1 x 3.9cm anterior mediastinal mass abutting the pericardium (2:59; previously 4.2 x 2.6 x 5.6cm)\par - stable 1cm Rt hilar LN\par - few subcentimeter mediastinal LNs\par - 5mm RLL nodule (3:101); small 4mm DOUG nodule (3:74); small 4mm RML nodule (3:92)\par - small 3mm calcified granuloma in the RUL\par \par PFTs on 10/15/22: FVC 98%, FEV1 96%, DLCO 77%.\par \par PET/CT on 10/27/22 @ [facility]:\par - decreased in size and activity of the anterior mediastinal mass, measuring 3.2 x 2.1cm SUV=3 now (image 63), previously 5.2 x 3.2cm SUV=12.7, contact the Rt pleural space as well as the anterior pericardium w/ slight mild mass effect on the Rt atrium\par - decreased activity w/in the adjacent sternum, now w/ SUV=3.6 (image 64), previously SUV=6.7\par - bilateral hilar LNs measuring up to 1cm in the Rt with SUV=5 (image 57; previously SUV=5.1)\par - several lung nodules bilaterally, measuring up to 5mm in the RML (image 65); 5mm in the RLL (image 66)\par - a new 6mm RUL ggo (image 51)\par \par Now s/p Flex Bronch, EBUS Bx on 11/29/22. Path of Lvl 7 LN negative for malignancy; R4 LN non diagnostic; R11 LN non diagnostic.\par \par Instructed patient to consult with Dr. Jj Roger for possible joint case: Resection of anterior mediastinal mass, rxn of sternum and reconstruction. She saw Dr. Roger on 12/7/2022. \par \par Pt presents today for follow up. The patient denies SOB, cough, chest pain, hemoptysis, palpitation, fever, recent illness. \par \par

## 2022-12-09 NOTE — ASSESSMENT
[FreeTextEntry1] : Ms. ALONSO HUITRON, 62 year old female, never a smoker, with thymic squamous cell Ca. s/p neoadjuvant chemo and RT in 9/2022. \par \par CT C/A/P w/ contrast on 10/5/22:\par - decreased in size 3.2 x 2.1 x 3.9cm anterior mediastinal mass abutting the pericardium (2:59; previously 4.2 x 2.6 x 5.6cm)\par - stable 1cm Rt hilar LN\par - few subcentimeter mediastinal LNs\par - 5mm RLL nodule (3:101); small 4mm DOUG nodule (3:74); small 4mm RML nodule (3:92)\par - small 3mm calcified granuloma in the RUL\par \par PFTs on 10/15/22: FVC 98%, FEV1 96%, DLCO 77%.\par \par PET/CT on 10/27/22 @ [facility]:\par - decreased in size and activity of the anterior mediastinal mass, measuring 3.2 x 2.1cm SUV=3 now (image 63), previously 5.2 x 3.2cm SUV=12.7, contact the Rt pleural space as well as the anterior pericardium w/ slight mild mass effect on the Rt atrium\par - decreased activity w/in the adjacent sternum, now w/ SUV=3.6 (image 64), previously SUV=6.7\par - bilateral hilar LNs measuring up to 1cm in the Rt with SUV=5 (image 57; previously SUV=5.1)\par - several lung nodules bilaterally, measuring up to 5mm in the RML (image 65); 5mm in the RLL (image 66)\par - a new 6mm RUL ggo (image 51)\par \par Now s/p Flex Bronch, EBUS Bx on 11/29/22. Path of Lvl 7 LN negative for malignancy; R4 LN non diagnostic; R11 LN non diagnostic.\par \par I have reviewed the patient's medical records and diagnostic images at time of this office consultation and have made the following recommendation:\par 1. Resection of anterior mediastinal mass, resection of sternum, muscle flap and reconstruction joint case with Dr. Jj Roger on 1/3/2023 at [facility]. \par All risks vs. benefits and alternatives were explained to the patient, all questions were answered, patient verbalized understanding, was in agreement with the plan to proceed.\par 2. Medical clearance. \par \par \par \par I, TAPAN Rios, personally performed the evaluation and management (E/M) services for this established patient who presents today with (a) new problem(s)/exacerbation of (an) existing condition(s). That E/M includes conducting the examination, assessing all new/exacerbated conditions, and establishing a new plan of care. Today, my ACP, Olivia Miller NP was here to observe my evaluation and management services for this new problem/exacerbated condition to be followed going forward.\par \par \par \par \par \par

## 2022-12-09 NOTE — CONSULT LETTER
[Dear  ___] : Dear  [unfilled], [Consult Letter:] : I had the pleasure of evaluating your patient, [unfilled]. [( Thank you for referring [unfilled] for consultation for _____ )] : Thank you for referring [unfilled] for consultation for [unfilled] [Please see my note below.] : Please see my note below. [Consult Closing:] : Thank you very much for allowing me to participate in the care of this patient.  If you have any questions, please do not hesitate to contact me. [Sincerely,] : Sincerely, [FreeTextEntry2] : Dr. Kb Solano (Pulm/Ref)\par Dr. Haley Richard (PCP) [FreeTextEntry3] : Chandler Mcqueen MD, MPH \par System Director of Thoracic Surgery \par Director of Comprehensive [facility] [place] \par Professor Cardiovascular & Thoracic Surgery \par [employer] [employer] at [facility]\par \par

## 2022-12-09 NOTE — DATA REVIEWED
[FreeTextEntry1] : I have independently reviewed the following:\par Flex Bronch, EBUS Bx on 11/29/22.

## 2022-12-19 ENCOUNTER — OUTPATIENT (OUTPATIENT)
Dept: OUTPATIENT SERVICES | Facility: HOSPITAL | Age: 62
LOS: 1 days | End: 2022-12-19

## 2022-12-19 VITALS
SYSTOLIC BLOOD PRESSURE: 118 MMHG | WEIGHT: 130.07 LBS | HEART RATE: 88 BPM | HEIGHT: 63 IN | DIASTOLIC BLOOD PRESSURE: 78 MMHG | RESPIRATION RATE: 16 BRPM | OXYGEN SATURATION: 99 % | TEMPERATURE: 98 F

## 2022-12-19 DIAGNOSIS — Z98.890 OTHER SPECIFIED POSTPROCEDURAL STATES: Chronic | ICD-10-CM

## 2022-12-19 DIAGNOSIS — D49.89 NEOPLASM OF UNSPECIFIED BEHAVIOR OF OTHER SPECIFIED SITES: ICD-10-CM

## 2022-12-19 LAB
ANION GAP SERPL CALC-SCNC: 10 MMOL/L — SIGNIFICANT CHANGE UP (ref 7–14)
BUN SERPL-MCNC: 16 MG/DL — SIGNIFICANT CHANGE UP (ref 7–23)
CALCIUM SERPL-MCNC: 9.4 MG/DL — SIGNIFICANT CHANGE UP (ref 8.4–10.5)
CHLORIDE SERPL-SCNC: 102 MMOL/L — SIGNIFICANT CHANGE UP (ref 98–107)
CO2 SERPL-SCNC: 29 MMOL/L — SIGNIFICANT CHANGE UP (ref 22–31)
CREAT SERPL-MCNC: 0.71 MG/DL — SIGNIFICANT CHANGE UP (ref 0.5–1.3)
EGFR: 95 ML/MIN/1.73M2 — SIGNIFICANT CHANGE UP
GLUCOSE SERPL-MCNC: 79 MG/DL — SIGNIFICANT CHANGE UP (ref 70–99)
HCT VFR BLD CALC: 37.6 % — SIGNIFICANT CHANGE UP (ref 34.5–45)
HGB BLD-MCNC: 12.4 G/DL — SIGNIFICANT CHANGE UP (ref 11.5–15.5)
MCHC RBC-ENTMCNC: 33 GM/DL — SIGNIFICANT CHANGE UP (ref 32–36)
MCHC RBC-ENTMCNC: 33.5 PG — SIGNIFICANT CHANGE UP (ref 27–34)
MCV RBC AUTO: 101.6 FL — HIGH (ref 80–100)
NRBC # BLD: 0 /100 WBCS — SIGNIFICANT CHANGE UP (ref 0–0)
NRBC # FLD: 0 K/UL — SIGNIFICANT CHANGE UP (ref 0–0)
PLATELET # BLD AUTO: 230 K/UL — SIGNIFICANT CHANGE UP (ref 150–400)
POTASSIUM SERPL-MCNC: 4.4 MMOL/L — SIGNIFICANT CHANGE UP (ref 3.5–5.3)
POTASSIUM SERPL-SCNC: 4.4 MMOL/L — SIGNIFICANT CHANGE UP (ref 3.5–5.3)
RBC # BLD: 3.7 M/UL — LOW (ref 3.8–5.2)
RBC # FLD: 10.9 % — SIGNIFICANT CHANGE UP (ref 10.3–14.5)
SODIUM SERPL-SCNC: 141 MMOL/L — SIGNIFICANT CHANGE UP (ref 135–145)
WBC # BLD: 4.36 K/UL — SIGNIFICANT CHANGE UP (ref 3.8–10.5)
WBC # FLD AUTO: 4.36 K/UL — SIGNIFICANT CHANGE UP (ref 3.8–10.5)

## 2022-12-19 RX ORDER — SODIUM CHLORIDE 9 MG/ML
1000 INJECTION, SOLUTION INTRAVENOUS
Refills: 0 | Status: DISCONTINUED | OUTPATIENT
Start: 2023-01-03 | End: 2023-01-05

## 2022-12-19 NOTE — H&P PST ADULT - PROBLEM SELECTOR PLAN 1
Pt is a 62 y.o. female ;pt speaks Mandarin, presents for preop eval to have resection of sternal anterior mediastinal mass on 1/3/22  labs pending, ekg done on Nov in chart.  Preop instructions provided to pt.  Famotidine and chlorhexidine scrubs provided to pt with instructions.  Pt advised to obtain COVID pcr 3 days prior to DOS.  Pt also advised to obtain medical clearance prior to DOS

## 2022-12-19 NOTE — H&P PST ADULT - HISTORY OF PRESENT ILLNESS
Pt is a 62 y.o. female ;pt speaks Mandarin ; daughter  in law Hill Oro  Pt reports h/o Thymic  cancer ; tx with chemo / RT completed 9/22.  Pt presents for preop eval to have resection of sternal anterior mediastinal mass on 1/3/22 Pt is a 62 y.o. female ;pt speaks Mandarin ; requested daughter  in law Hill Oro to translate.  Pt reports h/o Thymic  cancer ; tx with chemo / RT completed 9/22.  Pt presents for preop eval to have resection of sternal anterior mediastinal mass on 1/3/22

## 2022-12-19 NOTE — H&P PST ADULT - ASSESSMENT
Pt is a 62 y.o. female ;pt speaks Mandarin, presents for preop eval to have resection of sternal anterior mediastinal mass on 1/3/22

## 2022-12-19 NOTE — H&P PST ADULT - PATIENT/CAREGIVER ACCEPTED INTERPRETER SERVICES
Pt requested daughter to translate for her. Ms. Alejandra Kimbrough  Daughter in law [phone]/no

## 2022-12-19 NOTE — H&P PST ADULT - NSANTHOSAYNRD_GEN_A_CORE
never tested/No. ROSA screening performed.  STOP BANG Legend: 0-2 = LOW Risk; 3-4 = INTERMEDIATE Risk; 5-8 = HIGH Risk

## 2022-12-19 NOTE — H&P PST ADULT - ATTENDING COMMENTS
Patient with thymic CA s/p chemo and CRT, with involvement of sternum.  Plan radical excision of thymic carcinoma with resection of sternum and reconstruction, joint case with plastics Dr. Jj Roger for muscle flap reconstruction.

## 2022-12-23 DIAGNOSIS — Z01.818 ENCOUNTER FOR OTHER PREPROCEDURAL EXAMINATION: ICD-10-CM

## 2022-12-30 ENCOUNTER — LABORATORY RESULT (OUTPATIENT)
Age: 62
End: 2022-12-30

## 2022-12-30 NOTE — ASU PATIENT PROFILE, ADULT - VISION (WITH CORRECTIVE LENSES IF THE PATIENT USUALLY WEARS THEM):
DATE:  09/30/2019



PSYCHIATRIC PROGRESS NOTE



This late entry 09/30/2019 covers the elements not covered in my initial note.



SUBJECTIVE:  I met with the patient in the evening.  Per her Serena RN, the

patient slept 4-1/4 hours previous night.  She did well previous night and

during the day, received Klonopin x 1 on 09/30/2019 for her anxiety.



REVIEW OF SYSTEMS:  No CV, , pulmonary, eye, ENT system symptoms on review. 

She does have periorbital ecchymosis, right side and this is slowly improving,

status post fall.



MENTAL STATUS EXAM:  Reasonably oriented.  Speech is coherent, more animated,

smiling.  Abstraction fair, computation impaired, language function intact,

attention span short.  Mood and affect is improved.



LABORATORY DATA:  Reviewed.



IMPRESSION:  Unchanged from initial note.



PLAN:  No change from initial note.





______________________________

MAN BRIAN JAY MD



DR:  JAYLEEN/ted  JOB#:  [id number] / [id number]

DD:  10/01/2019 15:20  DT:  10/02/2019 06:26 Normal vision: sees adequately in most situations; can see medication labels, newsprint

## 2022-12-30 NOTE — ASU PATIENT PROFILE, ADULT - PATIENT/CAREGIVER ACCEPTED INTERPRETER SERVICES
Pt requested daughter to translate for her. Ms. Alejandra Kimbrough  Daughter in law [phone]/no yes

## 2022-12-30 NOTE — ASU PATIENT PROFILE, ADULT - TOBACCO USE
Patient was called three times and no answer so post 24 hr DC follow up calls will be closed out, message was left with contact number for department seen by or following up      Follow up with your primary care provider for continued medical care and hospital follow up in 5-10 days.   2. Follow up with Nephrology- Urology will call for follow up  3. Continue routine follow up with you Cardiologist for management of your cardiac medications   Never smoker

## 2022-12-30 NOTE — ASU PATIENT PROFILE, ADULT - ABLE TO REACH PT
11ST Physical Therapy Note:    Physical therapy orders received and acknowledged. Aware pt to have surgery this afternoon, 12/16. Will discharge current PT orders and will need new PT orders post op.   yes no

## 2023-01-03 ENCOUNTER — INPATIENT (INPATIENT)
Facility: HOSPITAL | Age: 63
LOS: 6 days | Discharge: HOME | End: 2023-01-10
Attending: THORACIC SURGERY (CARDIOTHORACIC VASCULAR SURGERY) | Admitting: THORACIC SURGERY (CARDIOTHORACIC VASCULAR SURGERY)
Payer: MEDICAID

## 2023-01-03 ENCOUNTER — APPOINTMENT (OUTPATIENT)
Dept: THORACIC SURGERY | Facility: HOSPITAL | Age: 63
End: 2023-01-03

## 2023-01-03 ENCOUNTER — RESULT REVIEW (OUTPATIENT)
Age: 63
End: 2023-01-03

## 2023-01-03 VITALS
TEMPERATURE: 98 F | WEIGHT: 130.07 LBS | RESPIRATION RATE: 16 BRPM | OXYGEN SATURATION: 100 % | DIASTOLIC BLOOD PRESSURE: 76 MMHG | SYSTOLIC BLOOD PRESSURE: 129 MMHG | HEART RATE: 86 BPM | HEIGHT: 63 IN

## 2023-01-03 DIAGNOSIS — Z98.890 OTHER SPECIFIED POSTPROCEDURAL STATES: Chronic | ICD-10-CM

## 2023-01-03 DIAGNOSIS — D49.89 NEOPLASM OF UNSPECIFIED BEHAVIOR OF OTHER SPECIFIED SITES: ICD-10-CM

## 2023-01-03 LAB
ANION GAP SERPL CALC-SCNC: 12 MMOL/L — SIGNIFICANT CHANGE UP (ref 7–14)
APTT BLD: 30.2 SEC — SIGNIFICANT CHANGE UP (ref 27–36.3)
BLOOD GAS ARTERIAL - LYTES,HGB,ICA,LACT RESULT: SIGNIFICANT CHANGE UP
BUN SERPL-MCNC: 10 MG/DL — SIGNIFICANT CHANGE UP (ref 7–23)
CALCIUM SERPL-MCNC: 8.3 MG/DL — LOW (ref 8.4–10.5)
CHLORIDE SERPL-SCNC: 103 MMOL/L — SIGNIFICANT CHANGE UP (ref 98–107)
CO2 SERPL-SCNC: 24 MMOL/L — SIGNIFICANT CHANGE UP (ref 22–31)
CREAT SERPL-MCNC: 0.47 MG/DL — LOW (ref 0.5–1.3)
EGFR: 107 ML/MIN/1.73M2 — SIGNIFICANT CHANGE UP
GLUCOSE SERPL-MCNC: 154 MG/DL — HIGH (ref 70–99)
HCT VFR BLD CALC: 36.8 % — SIGNIFICANT CHANGE UP (ref 34.5–45)
HGB BLD-MCNC: 12.1 G/DL — SIGNIFICANT CHANGE UP (ref 11.5–15.5)
INR BLD: 1.03 RATIO — SIGNIFICANT CHANGE UP (ref 0.88–1.16)
MAGNESIUM SERPL-MCNC: 1.8 MG/DL — SIGNIFICANT CHANGE UP (ref 1.6–2.6)
MCHC RBC-ENTMCNC: 32.6 PG — SIGNIFICANT CHANGE UP (ref 27–34)
MCHC RBC-ENTMCNC: 32.9 GM/DL — SIGNIFICANT CHANGE UP (ref 32–36)
MCV RBC AUTO: 99.2 FL — SIGNIFICANT CHANGE UP (ref 80–100)
NRBC # BLD: 0 /100 WBCS — SIGNIFICANT CHANGE UP (ref 0–0)
NRBC # FLD: 0 K/UL — SIGNIFICANT CHANGE UP (ref 0–0)
PHOSPHATE SERPL-MCNC: 3.6 MG/DL — SIGNIFICANT CHANGE UP (ref 2.5–4.5)
PLATELET # BLD AUTO: 173 K/UL — SIGNIFICANT CHANGE UP (ref 150–400)
POTASSIUM SERPL-MCNC: 3.6 MMOL/L — SIGNIFICANT CHANGE UP (ref 3.5–5.3)
POTASSIUM SERPL-SCNC: 3.6 MMOL/L — SIGNIFICANT CHANGE UP (ref 3.5–5.3)
PROTHROM AB SERPL-ACNC: 12 SEC — SIGNIFICANT CHANGE UP (ref 10.5–13.4)
RBC # BLD: 3.71 M/UL — LOW (ref 3.8–5.2)
RBC # FLD: 11.1 % — SIGNIFICANT CHANGE UP (ref 10.3–14.5)
SODIUM SERPL-SCNC: 139 MMOL/L — SIGNIFICANT CHANGE UP (ref 135–145)
WBC # BLD: 9.14 K/UL — SIGNIFICANT CHANGE UP (ref 3.8–10.5)
WBC # FLD AUTO: 9.14 K/UL — SIGNIFICANT CHANGE UP (ref 3.8–10.5)

## 2023-01-03 PROCEDURE — 60521 REMOVAL OF THYMUS GLAND: CPT | Mod: AS

## 2023-01-03 PROCEDURE — 32506 WEDGE RESECT OF LUNG ADD-ON: CPT | Mod: AS

## 2023-01-03 PROCEDURE — 99233 SBSQ HOSP IP/OBS HIGH 50: CPT

## 2023-01-03 PROCEDURE — 39220 RESECT MEDIASTINAL TUMOR: CPT | Mod: AS

## 2023-01-03 PROCEDURE — 32505 WEDGE RESECT OF LUNG INITIAL: CPT

## 2023-01-03 PROCEDURE — 88311 DECALCIFY TISSUE: CPT | Mod: 26

## 2023-01-03 PROCEDURE — 88331 PATH CONSLTJ SURG 1 BLK 1SPC: CPT | Mod: 26

## 2023-01-03 PROCEDURE — 33030 PARTIAL REMOVAL OF HEART SAC: CPT

## 2023-01-03 PROCEDURE — 88305 TISSUE EXAM BY PATHOLOGIST: CPT | Mod: 26

## 2023-01-03 PROCEDURE — 21630 RADICAL RESECTION STERNUM: CPT

## 2023-01-03 PROCEDURE — 88302 TISSUE EXAM BY PATHOLOGIST: CPT | Mod: 26

## 2023-01-03 PROCEDURE — 32506 WEDGE RESECT OF LUNG ADD-ON: CPT

## 2023-01-03 PROCEDURE — 32505 WEDGE RESECT OF LUNG INITIAL: CPT | Mod: AS

## 2023-01-03 PROCEDURE — 60521 REMOVAL OF THYMUS GLAND: CPT

## 2023-01-03 PROCEDURE — 39220 RESECT MEDIASTINAL TUMOR: CPT

## 2023-01-03 PROCEDURE — 21630 RADICAL RESECTION STERNUM: CPT | Mod: AS

## 2023-01-03 PROCEDURE — 33030 PARTIAL REMOVAL OF HEART SAC: CPT | Mod: AS

## 2023-01-03 PROCEDURE — 71045 X-RAY EXAM CHEST 1 VIEW: CPT | Mod: 26

## 2023-01-03 DEVICE — LIGATING CLIPS WECK HORIZON MEDIUM (BLUE) 24: Type: IMPLANTABLE DEVICE | Site: RIGHT | Status: FUNCTIONAL

## 2023-01-03 DEVICE — STAPLER COVIDIEN TRI-STAPLE CURVED 60MM PURPLE RELOAD: Type: IMPLANTABLE DEVICE | Site: RIGHT | Status: FUNCTIONAL

## 2023-01-03 DEVICE — STAPLER COVIDIEN TRI-STAPLE 45MM PURPLE RELOAD: Type: IMPLANTABLE DEVICE | Site: RIGHT | Status: FUNCTIONAL

## 2023-01-03 DEVICE — LIGATING CLIPS WECK HORIZON SMALL-WIDE (RED) 24: Type: IMPLANTABLE DEVICE | Site: RIGHT | Status: FUNCTIONAL

## 2023-01-03 DEVICE — LIGATING CLIPS WECK HORIZON LARGE (ORANGE) 24: Type: IMPLANTABLE DEVICE | Site: RIGHT | Status: FUNCTIONAL

## 2023-01-03 DEVICE — CEMENT SIMPLEX WITH TOBRAMYCIN: Type: IMPLANTABLE DEVICE | Site: RIGHT | Status: FUNCTIONAL

## 2023-01-03 DEVICE — CHEST DRAIN THORACIC ARGYLE PVC 28FR STRAIGHT: Type: IMPLANTABLE DEVICE | Site: RIGHT | Status: FUNCTIONAL

## 2023-01-03 DEVICE — IMPLANTABLE DEVICE: Type: IMPLANTABLE DEVICE | Site: RIGHT | Status: FUNCTIONAL

## 2023-01-03 RX ORDER — POTASSIUM CHLORIDE 20 MEQ
40 PACKET (EA) ORAL ONCE
Refills: 0 | Status: COMPLETED | OUTPATIENT
Start: 2023-01-03 | End: 2023-01-03

## 2023-01-03 RX ORDER — SENNA PLUS 8.6 MG/1
2 TABLET ORAL AT BEDTIME
Refills: 0 | Status: DISCONTINUED | OUTPATIENT
Start: 2023-01-03 | End: 2023-01-10

## 2023-01-03 RX ORDER — LIDOCAINE 4 G/100G
1 CREAM TOPICAL DAILY
Refills: 0 | Status: DISCONTINUED | OUTPATIENT
Start: 2023-01-03 | End: 2023-01-10

## 2023-01-03 RX ORDER — SODIUM CHLORIDE 9 MG/ML
4 INJECTION INTRAMUSCULAR; INTRAVENOUS; SUBCUTANEOUS EVERY 8 HOURS
Refills: 0 | Status: DISCONTINUED | OUTPATIENT
Start: 2023-01-03 | End: 2023-01-10

## 2023-01-03 RX ORDER — ONDANSETRON 8 MG/1
4 TABLET, FILM COATED ORAL EVERY 6 HOURS
Refills: 0 | Status: DISCONTINUED | OUTPATIENT
Start: 2023-01-03 | End: 2023-01-10

## 2023-01-03 RX ORDER — ACETAMINOPHEN 500 MG
750 TABLET ORAL ONCE
Refills: 0 | Status: DISCONTINUED | OUTPATIENT
Start: 2023-01-03 | End: 2023-01-10

## 2023-01-03 RX ORDER — NALOXONE HYDROCHLORIDE 4 MG/.1ML
0.1 SPRAY NASAL
Refills: 0 | Status: DISCONTINUED | OUTPATIENT
Start: 2023-01-03 | End: 2023-01-10

## 2023-01-03 RX ORDER — HYDROMORPHONE HYDROCHLORIDE 2 MG/ML
250 INJECTION INTRAMUSCULAR; INTRAVENOUS; SUBCUTANEOUS
Refills: 0 | Status: DISCONTINUED | OUTPATIENT
Start: 2023-01-03 | End: 2023-01-04

## 2023-01-03 RX ORDER — KETOROLAC TROMETHAMINE 30 MG/ML
15 SYRINGE (ML) INJECTION ONCE
Refills: 0 | Status: DISCONTINUED | OUTPATIENT
Start: 2023-01-03 | End: 2023-01-03

## 2023-01-03 RX ORDER — ALBUTEROL 90 UG/1
2.5 AEROSOL, METERED ORAL EVERY 8 HOURS
Refills: 0 | Status: DISCONTINUED | OUTPATIENT
Start: 2023-01-03 | End: 2023-01-08

## 2023-01-03 RX ORDER — POLYETHYLENE GLYCOL 3350 17 G/17G
17 POWDER, FOR SOLUTION ORAL DAILY
Refills: 0 | Status: DISCONTINUED | OUTPATIENT
Start: 2023-01-03 | End: 2023-01-10

## 2023-01-03 RX ORDER — GABAPENTIN 400 MG/1
300 CAPSULE ORAL ONCE
Refills: 0 | Status: COMPLETED | OUTPATIENT
Start: 2023-01-03 | End: 2023-01-03

## 2023-01-03 RX ORDER — HEPARIN SODIUM 5000 [USP'U]/ML
5000 INJECTION INTRAVENOUS; SUBCUTANEOUS EVERY 12 HOURS
Refills: 0 | Status: DISCONTINUED | OUTPATIENT
Start: 2023-01-03 | End: 2023-01-10

## 2023-01-03 RX ORDER — KETOROLAC TROMETHAMINE 30 MG/ML
15 SYRINGE (ML) INJECTION EVERY 6 HOURS
Refills: 0 | Status: DISCONTINUED | OUTPATIENT
Start: 2023-01-03 | End: 2023-01-05

## 2023-01-03 RX ORDER — MAGNESIUM SULFATE 500 MG/ML
2 VIAL (ML) INJECTION ONCE
Refills: 0 | Status: COMPLETED | OUTPATIENT
Start: 2023-01-03 | End: 2023-01-03

## 2023-01-03 RX ORDER — ACETAMINOPHEN 500 MG
975 TABLET ORAL ONCE
Refills: 0 | Status: COMPLETED | OUTPATIENT
Start: 2023-01-03 | End: 2023-01-03

## 2023-01-03 RX ORDER — FAMOTIDINE 10 MG/ML
20 INJECTION INTRAVENOUS EVERY 12 HOURS
Refills: 0 | Status: DISCONTINUED | OUTPATIENT
Start: 2023-01-03 | End: 2023-01-10

## 2023-01-03 RX ORDER — ACETAMINOPHEN 500 MG
750 TABLET ORAL EVERY 6 HOURS
Refills: 0 | Status: COMPLETED | OUTPATIENT
Start: 2023-01-03 | End: 2023-01-04

## 2023-01-03 RX ADMIN — Medication 25 GRAM(S): at 21:55

## 2023-01-03 RX ADMIN — Medication 975 MILLIGRAM(S): at 12:13

## 2023-01-03 RX ADMIN — HYDROMORPHONE HYDROCHLORIDE 250 MILLILITER(S): 2 INJECTION INTRAMUSCULAR; INTRAVENOUS; SUBCUTANEOUS at 21:27

## 2023-01-03 RX ADMIN — SODIUM CHLORIDE 30 MILLILITER(S): 9 INJECTION, SOLUTION INTRAVENOUS at 21:27

## 2023-01-03 RX ADMIN — Medication 15 MILLIGRAM(S): at 23:17

## 2023-01-03 RX ADMIN — GABAPENTIN 300 MILLIGRAM(S): 400 CAPSULE ORAL at 12:13

## 2023-01-03 RX ADMIN — Medication 40 MILLIEQUIVALENT(S): at 21:44

## 2023-01-03 RX ADMIN — Medication 15 MILLIGRAM(S): at 23:00

## 2023-01-03 RX ADMIN — SENNA PLUS 2 TABLET(S): 8.6 TABLET ORAL at 21:20

## 2023-01-03 NOTE — BRIEF OPERATIVE NOTE - NSICDXBRIEFPOSTOP_GEN_ALL_CORE_FT
POST-OP DIAGNOSIS:  Wound of sternal region 03-Jan-2023 18:14:46  Ever Torres  
POST-OP DIAGNOSIS:  Thymic cancer 03-Jan-2023 18:29:45  Omi Vu

## 2023-01-03 NOTE — BRIEF OPERATIVE NOTE - DISPOSITION
Patient:   YAQUELIN PEACOCK            MRN: [id number]            FIN: [id number]              Age:   60 years     Sex:  FEMALE     :  57   Associated Diagnoses:   None   Author:   KONSTANTIN, ABHIJEET     Hospitalist Discharge Summary  Admission Date  _2020  Discharge Date  _2020  Disposition  _Home  Reason for Admission  _Shortness of breath  Hospital Course by Problem & Discharge Diagnoses  Sepsis due to COVID-19  Asthma exacerbation  Hypothermia 35.6, sinus tachycardia up to 137, tachypnea up to 26 upon, bandemia 2% admission.  Lactic acid within normal limits  Blood cultures negative to date  Chest x-ray with bibasilar patchy infiltrates  O2 saturations within normal limits on room air  Patient reports that she was diagnosed with COVID-19 at [facility] and was hospitalized for 5 days.  She thinks she had received hydroxychloroquine and azithromycin while she was there.  Records requested from there.  Albuterol inhalers every 4 hours  Symptoms improved  Stable for discharge  Elevated troponin; Type II MI  HL  Troponin elevated up to 0.13  Patient with no chest pain  EKG with no ST elevation  Lexiscan back in 2018 was negative for ischemia.  Echo done this admission as blow  Lipid panel reviewed  ASA, statin, BB  Cardiology consulted  No further cardiac workup is recommended by cardiology  Hypertensive heart disease with CHF  Chronic combined diastolic and systolic CHF  Echocardiogram back in 2018 showed EF 45 to 50%, grade 1 diastolic dysfunction. Reat echo done this admisison with no sig change.  proBNP elevated at 1846  No symptoms of acute CHF exacerbation at this time  ELIS  Creatinine 1.46 upon admission.  Baseline 0.9-1.1 previously.  Hold home ACEI  Urinalysis with glucosuria and positive ketones.  Otherwise unremarkable.  Avoid NSAIDs, nephrotoxins  Monitor renal function  Will avoid IV fluids in the setting of active COVID-19  Slowly improving  Follow-up  with PCP for repeat labs in 1-2 wks  Hypokalemia  Hypomagnesemia  Potassium level 2.9, magnesium level 1.5 upon admission  Resolved  Transaminitis   AST elevated at 72 upon admission.  Bilirubin within normal limits.  Likely due to COVID-19  Improving  Type II non-insulin-dependent diabetes mellitus with hyperglycemia  Blood glucose elevated at 173 upon admission  Resume home metformin upon discharge  Acute anemia  Hemoglobin 10.6, MCV 97 upon admission.  Previous baseline has been 12-13 in Nov 2018.  No active bleeding noted  Leukopenia; Mild neutropenia  WBC 3.8. ANC slightly decreased at 1300.  Likely due to COVID-19  Follow up with PCP for repeat labs in 1-2 wks  Breast CA s/p L mastectomy in the past  No acute issues at this time  Follow up with PCP for futher management  PCP: Get Brewer  Vitals:   Vitals between:   12-APR-2020 13:11:21   TO   13-APR-2020 13:11:21                   LAST RESULT MINIMUM MAXIMUM  Temperature 36.8 36.3 37  Heart Rate 72 72 101  Respiratory Rate 14 14 20  NISBP           132 115 142  NIDBP           74 74 97  NIMBP           93 93 110  SpO2                    98 95 99  Gen: Not in acute distress, overweight  HEENT: anicteric, mmm, No conjunctival pallor/injection,  Heme: No lymphadenopathy, no bruises, no bleeding, no pallor  C/V: No elevated jugular venous pressure, No carotid bruits, S1, S2, No m/r/g, No LE edema  Lungs: Clear to auscultation b/l  Abd: +BS, Nondistended, nontender, No organomegaly  : No CVAT, No suprapubic tenderness  Ext: No joint erythema/swelling/effusion  Skin: No rash, no jaundice  Neuro:  AAO x3, slightly hard of hearing otherwise CN grossly intact, strength and sensations normal  Labs between:  12-APR-2020 08:40 to 13-APR-2020 08:40  CBC:                 WBC  HgB  Hct  Plt  MCV  RDW   13-APR-2020 (L) 3.8  (L) 10.1  (L) 27.7  257  99.3  12.0   DIFF:                 Seg  Neutroph//ABS  Lymph//ABS  Mono//ABS  EOS/ABS  13-APR-2020 32  // (L) 1.3  // 1.8 //  0.6 // (L) 0.0   BMP:                 Na  Cl  BUN  Glu   13-APR-2020 142  107  16  (H) 100                              K  CO2  Cr  Ca                              4.7  25  (H) 1.30  9.2   CMP:                 AST  ALT  AlkPhos  Bili  Albumin   13-APR-2020 (H) 54  33  (L) 39  0.4  (L) 2.8   Other Chem:             Mg  Phos  Triglycerides  GGTP  DirectBili                           1.9           POC GLU:                 Latest Result  Latest Date  Minimum  Min Date  Maximum  Max Date                             92 13-APR-2020 74 12-APR-2020 (H) 109  12-APR-2020  Cardiovascular Labs    NT proBNP 1846 pg/mL (04/11/20 22:36)    Troponin 0.13 ng/mL (04/12/20 12:14) , 0.12 ng/mL (04/12/20 07:41) , 0.09 ng/mL (04/11/20 22:36)    unit/L (04/11/20 22:36)   Result title:        XR CHEST 1V  Verified by:        JULIO CESAR ROBERTSON on 04/12/2020 :12  IMPRESSION:There is patchy increased attenuation projecting over the left and right lung base and possibly periphery right midlung which may be compatible with multifocal pneumonitis and in the appropriate clinical setting may be consistent with Covid 19 etiology.  Recommend correlation with clinical assessment and follow-up chest x-ray as warranted.Heart size, pulmonary vasculature and the mediastinal contour are normal.  No evidence of  significant pleural effusions or pneumothorax.Overlying EKG leads.  Result Type: CD ECHO 2D F-U/LTD-ADULT  Result Date: April 12, 2020 10:38 CDT  1. Procedure narrative: Limited Transthoracic echocardiography was performed. Image quality was good.  2. Left ventricle: The cavity size is normal. Systolic function is mildly to moderately reduced. The estimated ejection fraction is 40-45%, by single plane method of disks. Global hypokinesis.     DISCHARGE MEDICATION LIST   aspirin (aspirin oral 81 mg DR tablet (Ecotrin Low Strength))  81 mg=1 tab  Oral  Daily  - Can get over the counter  atorvastatin (atorvastatin oral 20 mg tablet)  20 mg=1 tab   Oral  Daily    budesonide-formoterol (Symbicort inhaler oral 160-4.5 mcg/puff)  2 puff  Inhaled  Twice daily    divalproex sodium (Depakote oral 500 mg DR tablet)  500 mg=1 tab  Oral  Daily    divalproex sodium (divalproex sodium oral 500 mg ER tablet)  1,000 mg=2 tab  Oral  Nightly at bedtime    enalapril (enalapril oral 5 mg tablet)  5 mg=1 tab  Oral  Daily  - Hold this medication until you see your primary care doctor.  Follow your doctor's instructions on whether to resume this medication depending on your kidney function improvement.  fluticasone-salmeterol (fluticasone-salmeterol 113 mcg-14 mcg/inh inhalation powder)  1 puff  Inhaled  Twice daily  - Rinse mouth and throat after use.  metFORMIN  850 mg  Oral  Twice daily     metoprolol (Lopressor (tartrate) oral 50 mg tablet)  50 mg=1 tab  Oral  Every 12 hoursFor 30 days    QUEtiapine  600 mg  Oral  Nightly at bedtime     QUEtiapine (SEROquel oral 300 mg tablet)  300 mg=1 tab  Oral  Daily    Consulting Physicians   Physician Name:  PHIL PENN Speciality:  CARDIOLOGY Consult Reason:  elevated trop  Follow-up instructions:  With: Address: When:   PHIL STEWART MD [address] [place] [address]  [phone] Business (1)    Comments:   Cardiology follow-up.   Call for follow up appointment   With: Address: When:   DALLAS FONSECA [address] [place] [address]  [phone] Business (1) Within 1 to 2 weeks   Comments:   Call for follow up appointment   With: Address: When:   PT IS A JENCARE PATIENT     I spent 40 minutes completing this patient's discharge.   CTU

## 2023-01-03 NOTE — BRIEF OPERATIVE NOTE - NSICDXBRIEFPROCEDURE_GEN_ALL_CORE_FT
PROCEDURES:  Right pectoralis advancement flap 03-Jan-2023 18:14:15  Ever Torres  Left pectoralis advancement flap 03-Jan-2023 18:14:31  Ever Torres  
PROCEDURES:  Right pectoralis advancement flap 03-Jan-2023 18:14:15  Ever Torres  Left pectoralis advancement flap 03-Jan-2023 18:14:31  Ever Torres  Sternectomy 03-Jan-2023 18:27:00  Omi Vu  Thymectomy, sternal approach 03-Jan-2023 18:29:19  Omi Vu

## 2023-01-03 NOTE — PATIENT PROFILE ADULT - FALL HARM RISK - UNIVERSAL INTERVENTIONS
Bed in lowest position, wheels locked, appropriate side rails in place/Call bell, personal items and telephone in reach/Instruct patient to call for assistance before getting out of bed or chair/Non-slip footwear when patient is out of bed/[place] to call system/Physically safe environment - no spills, clutter or unnecessary equipment/Purposeful Proactive Rounding/Room/bathroom lighting operational, light cord in reach

## 2023-01-03 NOTE — BRIEF OPERATIVE NOTE - SPECIMENS
per CT surgery
Lateral Intercostal margins, 1,2,3,4; Lateral rib margins 2,3,4; en-bloc anterior mediastinal mass resection

## 2023-01-03 NOTE — BRIEF OPERATIVE NOTE - BRIEF OP NOTE DRAINS
2x 15fr vanessa drains
Right on chest wall: chest tube in r pleural space to sxn. +AL.  Second from right on chest wall (25Fr vanessa): mediastinal / pericardial drain to sxn.  Third from right on chest wall: (plastic surgery placed): deep vanessa drain between pectoralis and mesh  Left onc chest wall: (plastic surgery placed): superficial vanessa drain between pectoralis flap and subcutaneous tissue

## 2023-01-03 NOTE — BRIEF OPERATIVE NOTE - OPERATION/FINDINGS
bilateral pectoralis advancement flaps with midline closure.
Flexible bronchoscopy  En bloc thymectomy with partial sternectomy and resection of anterior right ribs 2-4 and wedge resection of right middle lobe.   Reconstruction of chest wall with MMA patch.  Bilateral pectoralis advancement flaps and closure by plastic surgery.

## 2023-01-04 LAB
ALBUMIN SERPL ELPH-MCNC: 3.8 G/DL — SIGNIFICANT CHANGE UP (ref 3.3–5)
ALP SERPL-CCNC: 53 U/L — SIGNIFICANT CHANGE UP (ref 40–120)
ALT FLD-CCNC: 13 U/L — SIGNIFICANT CHANGE UP (ref 4–33)
ANION GAP SERPL CALC-SCNC: 11 MMOL/L — SIGNIFICANT CHANGE UP (ref 7–14)
APTT BLD: 30.2 SEC — SIGNIFICANT CHANGE UP (ref 27–36.3)
AST SERPL-CCNC: 28 U/L — SIGNIFICANT CHANGE UP (ref 4–32)
BASOPHILS # BLD AUTO: 0.02 K/UL — SIGNIFICANT CHANGE UP (ref 0–0.2)
BASOPHILS NFR BLD AUTO: 0.2 % — SIGNIFICANT CHANGE UP (ref 0–2)
BILIRUB SERPL-MCNC: 0.4 MG/DL — SIGNIFICANT CHANGE UP (ref 0.2–1.2)
BLOOD GAS ARTERIAL - LYTES,HGB,ICA,LACT RESULT: SIGNIFICANT CHANGE UP
BUN SERPL-MCNC: 11 MG/DL — SIGNIFICANT CHANGE UP (ref 7–23)
CALCIUM SERPL-MCNC: 8.2 MG/DL — LOW (ref 8.4–10.5)
CHLORIDE SERPL-SCNC: 99 MMOL/L — SIGNIFICANT CHANGE UP (ref 98–107)
CO2 SERPL-SCNC: 24 MMOL/L — SIGNIFICANT CHANGE UP (ref 22–31)
CREAT SERPL-MCNC: 0.56 MG/DL — SIGNIFICANT CHANGE UP (ref 0.5–1.3)
EGFR: 102 ML/MIN/1.73M2 — SIGNIFICANT CHANGE UP
EOSINOPHIL # BLD AUTO: 0 K/UL — SIGNIFICANT CHANGE UP (ref 0–0.5)
EOSINOPHIL NFR BLD AUTO: 0 % — SIGNIFICANT CHANGE UP (ref 0–6)
GLUCOSE SERPL-MCNC: 152 MG/DL — HIGH (ref 70–99)
HCT VFR BLD CALC: 36.4 % — SIGNIFICANT CHANGE UP (ref 34.5–45)
HGB BLD-MCNC: 11.8 G/DL — SIGNIFICANT CHANGE UP (ref 11.5–15.5)
IANC: 9.52 K/UL — HIGH (ref 1.8–7.4)
IMM GRANULOCYTES NFR BLD AUTO: 0.4 % — SIGNIFICANT CHANGE UP (ref 0–0.9)
INR BLD: 1.03 RATIO — SIGNIFICANT CHANGE UP (ref 0.88–1.16)
LYMPHOCYTES # BLD AUTO: 0.44 K/UL — LOW (ref 1–3.3)
LYMPHOCYTES # BLD AUTO: 4.2 % — LOW (ref 13–44)
MAGNESIUM SERPL-MCNC: 2.5 MG/DL — SIGNIFICANT CHANGE UP (ref 1.6–2.6)
MCHC RBC-ENTMCNC: 32.4 GM/DL — SIGNIFICANT CHANGE UP (ref 32–36)
MCHC RBC-ENTMCNC: 32.4 PG — SIGNIFICANT CHANGE UP (ref 27–34)
MCV RBC AUTO: 100 FL — SIGNIFICANT CHANGE UP (ref 80–100)
MONOCYTES # BLD AUTO: 0.53 K/UL — SIGNIFICANT CHANGE UP (ref 0–0.9)
MONOCYTES NFR BLD AUTO: 5 % — SIGNIFICANT CHANGE UP (ref 2–14)
NEUTROPHILS # BLD AUTO: 9.52 K/UL — HIGH (ref 1.8–7.4)
NEUTROPHILS NFR BLD AUTO: 90.2 % — HIGH (ref 43–77)
NRBC # BLD: 0 /100 WBCS — SIGNIFICANT CHANGE UP (ref 0–0)
NRBC # FLD: 0 K/UL — SIGNIFICANT CHANGE UP (ref 0–0)
PHOSPHATE SERPL-MCNC: 4.2 MG/DL — SIGNIFICANT CHANGE UP (ref 2.5–4.5)
PLATELET # BLD AUTO: 190 K/UL — SIGNIFICANT CHANGE UP (ref 150–400)
POTASSIUM SERPL-MCNC: 4.1 MMOL/L — SIGNIFICANT CHANGE UP (ref 3.5–5.3)
POTASSIUM SERPL-SCNC: 4.1 MMOL/L — SIGNIFICANT CHANGE UP (ref 3.5–5.3)
PROT SERPL-MCNC: 6.8 G/DL — SIGNIFICANT CHANGE UP (ref 6–8.3)
PROTHROM AB SERPL-ACNC: 12 SEC — SIGNIFICANT CHANGE UP (ref 10.5–13.4)
RBC # BLD: 3.64 M/UL — LOW (ref 3.8–5.2)
RBC # FLD: 11.2 % — SIGNIFICANT CHANGE UP (ref 10.3–14.5)
SODIUM SERPL-SCNC: 134 MMOL/L — LOW (ref 135–145)
WBC # BLD: 10.55 K/UL — HIGH (ref 3.8–10.5)
WBC # FLD AUTO: 10.55 K/UL — HIGH (ref 3.8–10.5)

## 2023-01-04 PROCEDURE — 99233 SBSQ HOSP IP/OBS HIGH 50: CPT

## 2023-01-04 PROCEDURE — 71045 X-RAY EXAM CHEST 1 VIEW: CPT | Mod: 26

## 2023-01-04 RX ORDER — ROPIVACAINE HCL/PF 5 MG/ML
200 AMPUL (ML) INJECTION
Refills: 0 | Status: DISCONTINUED | OUTPATIENT
Start: 2023-01-04 | End: 2023-01-05

## 2023-01-04 RX ORDER — CALCIUM GLUCONATE 100 MG/ML
1 VIAL (ML) INTRAVENOUS ONCE
Refills: 0 | Status: COMPLETED | OUTPATIENT
Start: 2023-01-04 | End: 2023-01-04

## 2023-01-04 RX ORDER — HYDROMORPHONE HYDROCHLORIDE 2 MG/ML
30 INJECTION INTRAMUSCULAR; INTRAVENOUS; SUBCUTANEOUS
Refills: 0 | Status: DISCONTINUED | OUTPATIENT
Start: 2023-01-04 | End: 2023-01-05

## 2023-01-04 RX ORDER — METOCLOPRAMIDE HCL 10 MG
10 TABLET ORAL ONCE
Refills: 0 | Status: COMPLETED | OUTPATIENT
Start: 2023-01-04 | End: 2023-01-04

## 2023-01-04 RX ORDER — METOCLOPRAMIDE HCL 10 MG
10 TABLET ORAL EVERY 8 HOURS
Refills: 0 | Status: DISCONTINUED | OUTPATIENT
Start: 2023-01-04 | End: 2023-01-10

## 2023-01-04 RX ORDER — HYDROMORPHONE HYDROCHLORIDE 2 MG/ML
0.25 INJECTION INTRAMUSCULAR; INTRAVENOUS; SUBCUTANEOUS
Refills: 0 | Status: DISCONTINUED | OUTPATIENT
Start: 2023-01-04 | End: 2023-01-05

## 2023-01-04 RX ORDER — ALBUMIN HUMAN 25 %
250 VIAL (ML) INTRAVENOUS ONCE
Refills: 0 | Status: COMPLETED | OUTPATIENT
Start: 2023-01-04 | End: 2023-01-04

## 2023-01-04 RX ADMIN — Medication 300 MILLIGRAM(S): at 05:20

## 2023-01-04 RX ADMIN — ALBUTEROL 2.5 MILLIGRAM(S): 90 AEROSOL, METERED ORAL at 15:38

## 2023-01-04 RX ADMIN — Medication 100 GRAM(S): at 06:50

## 2023-01-04 RX ADMIN — Medication 10 MILLIGRAM(S): at 02:38

## 2023-01-04 RX ADMIN — HYDROMORPHONE HYDROCHLORIDE 30 MILLILITER(S): 2 INJECTION INTRAMUSCULAR; INTRAVENOUS; SUBCUTANEOUS at 13:15

## 2023-01-04 RX ADMIN — LIDOCAINE 1 PATCH: 4 CREAM TOPICAL at 22:56

## 2023-01-04 RX ADMIN — SODIUM CHLORIDE 4 MILLILITER(S): 9 INJECTION INTRAMUSCULAR; INTRAVENOUS; SUBCUTANEOUS at 15:38

## 2023-01-04 RX ADMIN — SODIUM CHLORIDE 4 MILLILITER(S): 9 INJECTION INTRAMUSCULAR; INTRAVENOUS; SUBCUTANEOUS at 03:46

## 2023-01-04 RX ADMIN — HEPARIN SODIUM 5000 UNIT(S): 5000 INJECTION INTRAVENOUS; SUBCUTANEOUS at 18:14

## 2023-01-04 RX ADMIN — HYDROMORPHONE HYDROCHLORIDE 250 MILLILITER(S): 2 INJECTION INTRAMUSCULAR; INTRAVENOUS; SUBCUTANEOUS at 07:20

## 2023-01-04 RX ADMIN — SODIUM CHLORIDE 30 MILLILITER(S): 9 INJECTION, SOLUTION INTRAVENOUS at 08:01

## 2023-01-04 RX ADMIN — ONDANSETRON 4 MILLIGRAM(S): 8 TABLET, FILM COATED ORAL at 00:07

## 2023-01-04 RX ADMIN — ALBUTEROL 2.5 MILLIGRAM(S): 90 AEROSOL, METERED ORAL at 07:01

## 2023-01-04 RX ADMIN — SODIUM CHLORIDE 4 MILLILITER(S): 9 INJECTION INTRAMUSCULAR; INTRAVENOUS; SUBCUTANEOUS at 07:01

## 2023-01-04 RX ADMIN — Medication 200 MILLILITER(S): at 19:14

## 2023-01-04 RX ADMIN — ONDANSETRON 4 MILLIGRAM(S): 8 TABLET, FILM COATED ORAL at 08:01

## 2023-01-04 RX ADMIN — HYDROMORPHONE HYDROCHLORIDE 30 MILLILITER(S): 2 INJECTION INTRAMUSCULAR; INTRAVENOUS; SUBCUTANEOUS at 19:13

## 2023-01-04 RX ADMIN — SENNA PLUS 2 TABLET(S): 8.6 TABLET ORAL at 21:39

## 2023-01-04 RX ADMIN — FAMOTIDINE 20 MILLIGRAM(S): 10 INJECTION INTRAVENOUS at 18:13

## 2023-01-04 RX ADMIN — HEPARIN SODIUM 5000 UNIT(S): 5000 INJECTION INTRAVENOUS; SUBCUTANEOUS at 05:20

## 2023-01-04 RX ADMIN — HYDROMORPHONE HYDROCHLORIDE 250 MILLILITER(S): 2 INJECTION INTRAMUSCULAR; INTRAVENOUS; SUBCUTANEOUS at 08:05

## 2023-01-04 RX ADMIN — HYDROMORPHONE HYDROCHLORIDE 250 MILLILITER(S): 2 INJECTION INTRAMUSCULAR; INTRAVENOUS; SUBCUTANEOUS at 03:36

## 2023-01-04 RX ADMIN — Medication 200 MILLILITER(S): at 13:21

## 2023-01-04 RX ADMIN — ALBUTEROL 2.5 MILLIGRAM(S): 90 AEROSOL, METERED ORAL at 03:46

## 2023-01-04 RX ADMIN — Medication 300 MILLIGRAM(S): at 18:13

## 2023-01-04 RX ADMIN — Medication 250 MILLILITER(S): at 09:14

## 2023-01-04 RX ADMIN — Medication 750 MILLIGRAM(S): at 00:25

## 2023-01-04 RX ADMIN — Medication 300 MILLIGRAM(S): at 12:13

## 2023-01-04 RX ADMIN — FAMOTIDINE 20 MILLIGRAM(S): 10 INJECTION INTRAVENOUS at 05:18

## 2023-01-04 RX ADMIN — LIDOCAINE 1 PATCH: 4 CREAM TOPICAL at 12:13

## 2023-01-04 RX ADMIN — Medication 750 MILLIGRAM(S): at 05:50

## 2023-01-04 RX ADMIN — Medication 300 MILLIGRAM(S): at 00:07

## 2023-01-04 RX ADMIN — Medication 750 MILLIGRAM(S): at 13:00

## 2023-01-04 NOTE — PHYSICAL THERAPY INITIAL EVALUATION ADULT - MANUAL MUSCLE TESTING RESULTS, REHAB EVAL
surgical/cardiac precautions; bilateral UE at least 3/5, bilateral LE at least 3+/5/grossly assessed due to

## 2023-01-04 NOTE — PHYSICAL THERAPY INITIAL EVALUATION ADULT - ACTIVE RANGE OF MOTION EXAMINATION, REHAB EVAL
bilateral shoulder flexion to 90 degrees, bilateral elbows/hands/wrist WFL/bilateral  lower extremity Active ROM was WFL (within functional limits)

## 2023-01-04 NOTE — PHYSICAL THERAPY INITIAL EVALUATION ADULT - PATIENT PROFILE REVIEW, REHAB EVAL
yes No Formal Activity Order in the Computer; spoke with JOSE Hooks prior-> Pt OK for PT consult/OOB activity./yes

## 2023-01-04 NOTE — PHYSICAL THERAPY INITIAL EVALUATION ADULT - PASSIVE RANGE OF MOTION EXAMINATION, REHAB EVAL
bilateral shoulder flexion to 90 degrees, bilateral elbows/hands/wrist WFL/bilateral lower extremity Passive ROM was WFL (within functional limits)

## 2023-01-04 NOTE — PHYSICAL THERAPY INITIAL EVALUATION ADULT - ADDITIONAL COMMENTS
Pt reports that she lives with her son with no steps to negotiate. Prior to hospital admission pt was completely independent and used no assistive device with ambulation. Pt denies any recent falls or use of home O2.    Pt left comfortable in chair, NAD, all lines intact, all precautions maintained, with call bell in reach, son @bedside, and RN aware.

## 2023-01-04 NOTE — PHYSICAL THERAPY INITIAL EVALUATION ADULT - DID THE PATIENT HAVE SURGERY?
s/p Sternectomy, Left pectoralis advancement flap, Right pectoralis advancement flap, Thymectomy/yes

## 2023-01-04 NOTE — PROGRESS NOTE ADULT - ASSESSMENT
61 y/o female POD #1 s/p thymic mass excision and bilateral pectoral advancement flaps with midline closure, progressing well. Nausea and emesis overnight. Pain controlled on PCEA. ARCHIE drains with appropriate SS output. Pleurevac in place, saturating high 90's on 2L NC. Appropriate UOP. AVSS. H/H 11.8/36.4 from 12.1/36.8.     Plans per primary team, plastic surgery recommends:  -no abduction bilateral arms  -continue ARCHIE drains, ARCHIE drain care per protocol  -remainder of plans per primary team  -patient and plans d/w Dr. Roger

## 2023-01-04 NOTE — PROGRESS NOTE ADULT - ASSESSMENT
A: Mack is a 63yo F who is now s/p thymic mass resection by CT surgery and bilateral pec flap advancement by PRS on 1/3/23. Patient is recovering well.     Plan:   -Monitor I&Os, ARCHIE output, can keep ARCHIE to wall suction  -Pain: PCEA  -Ppx: SQH  -Rest of care per primary team    Andrea Lopezsh  Plastic Surgery  #[id number] [facility] pager  [phone] [facility] pager  Available on teams

## 2023-01-04 NOTE — PHYSICAL THERAPY INITIAL EVALUATION ADULT - PRECAUTIONS/LIMITATIONS, REHAB EVAL
+1L of O2 through nasal cannula/cardiac precautions/oxygen therapy device and L/min/surgical precautions

## 2023-01-04 NOTE — PHYSICAL THERAPY INITIAL EVALUATION ADULT - GENERAL OBSERVATIONS, REHAB EVAL
Pt encountered seated in chair, no distress, AxOx4, with +IV, chest dressing dry/intact, +chest tubex2, with +IV, +tele, +pulse oximeter, +1L of O2 through nasal cannula, and son @bedside. Pt agreeable to participate in PT evaluation.

## 2023-01-05 LAB
ANION GAP SERPL CALC-SCNC: 8 MMOL/L — SIGNIFICANT CHANGE UP (ref 7–14)
APTT BLD: 32.6 SEC — SIGNIFICANT CHANGE UP (ref 27–36.3)
BUN SERPL-MCNC: 10 MG/DL — SIGNIFICANT CHANGE UP (ref 7–23)
CALCIUM SERPL-MCNC: 8.6 MG/DL — SIGNIFICANT CHANGE UP (ref 8.4–10.5)
CHLORIDE SERPL-SCNC: 100 MMOL/L — SIGNIFICANT CHANGE UP (ref 98–107)
CO2 SERPL-SCNC: 29 MMOL/L — SIGNIFICANT CHANGE UP (ref 22–31)
CREAT SERPL-MCNC: 0.65 MG/DL — SIGNIFICANT CHANGE UP (ref 0.5–1.3)
EGFR: 99 ML/MIN/1.73M2 — SIGNIFICANT CHANGE UP
GLUCOSE SERPL-MCNC: 109 MG/DL — HIGH (ref 70–99)
HCT VFR BLD CALC: 35 % — SIGNIFICANT CHANGE UP (ref 34.5–45)
HGB BLD-MCNC: 11.3 G/DL — LOW (ref 11.5–15.5)
INR BLD: 1.11 RATIO — SIGNIFICANT CHANGE UP (ref 0.88–1.16)
MAGNESIUM SERPL-MCNC: 2 MG/DL — SIGNIFICANT CHANGE UP (ref 1.6–2.6)
MCHC RBC-ENTMCNC: 32.3 GM/DL — SIGNIFICANT CHANGE UP (ref 32–36)
MCHC RBC-ENTMCNC: 32.7 PG — SIGNIFICANT CHANGE UP (ref 27–34)
MCV RBC AUTO: 101.2 FL — HIGH (ref 80–100)
NRBC # BLD: 0 /100 WBCS — SIGNIFICANT CHANGE UP (ref 0–0)
NRBC # FLD: 0 K/UL — SIGNIFICANT CHANGE UP (ref 0–0)
PHOSPHATE SERPL-MCNC: 2.2 MG/DL — LOW (ref 2.5–4.5)
PLATELET # BLD AUTO: 181 K/UL — SIGNIFICANT CHANGE UP (ref 150–400)
POTASSIUM SERPL-MCNC: 4.4 MMOL/L — SIGNIFICANT CHANGE UP (ref 3.5–5.3)
POTASSIUM SERPL-SCNC: 4.4 MMOL/L — SIGNIFICANT CHANGE UP (ref 3.5–5.3)
PROTHROM AB SERPL-ACNC: 12.9 SEC — SIGNIFICANT CHANGE UP (ref 10.5–13.4)
RBC # BLD: 3.46 M/UL — LOW (ref 3.8–5.2)
RBC # FLD: 11.4 % — SIGNIFICANT CHANGE UP (ref 10.3–14.5)
SODIUM SERPL-SCNC: 137 MMOL/L — SIGNIFICANT CHANGE UP (ref 135–145)
WBC # BLD: 9.82 K/UL — SIGNIFICANT CHANGE UP (ref 3.8–10.5)
WBC # FLD AUTO: 9.82 K/UL — SIGNIFICANT CHANGE UP (ref 3.8–10.5)

## 2023-01-05 PROCEDURE — 71045 X-RAY EXAM CHEST 1 VIEW: CPT | Mod: 26,77

## 2023-01-05 PROCEDURE — 71045 X-RAY EXAM CHEST 1 VIEW: CPT | Mod: 26

## 2023-01-05 PROCEDURE — 99233 SBSQ HOSP IP/OBS HIGH 50: CPT

## 2023-01-05 RX ORDER — OXYCODONE HYDROCHLORIDE 5 MG/1
5 TABLET ORAL
Refills: 0 | Status: DISCONTINUED | OUTPATIENT
Start: 2023-01-05 | End: 2023-01-10

## 2023-01-05 RX ORDER — ROPIVACAINE HCL/PF 5 MG/ML
200 AMPUL (ML) INJECTION
Refills: 0 | Status: DISCONTINUED | OUTPATIENT
Start: 2023-01-05 | End: 2023-01-07

## 2023-01-05 RX ORDER — HYDROMORPHONE HYDROCHLORIDE 2 MG/ML
0.3 INJECTION INTRAMUSCULAR; INTRAVENOUS; SUBCUTANEOUS
Refills: 0 | Status: DISCONTINUED | OUTPATIENT
Start: 2023-01-05 | End: 2023-01-06

## 2023-01-05 RX ORDER — ALBUMIN HUMAN 25 %
250 VIAL (ML) INTRAVENOUS ONCE
Refills: 0 | Status: COMPLETED | OUTPATIENT
Start: 2023-01-05 | End: 2023-01-05

## 2023-01-05 RX ORDER — KETOROLAC TROMETHAMINE 30 MG/ML
15 SYRINGE (ML) INJECTION EVERY 6 HOURS
Refills: 0 | Status: DISCONTINUED | OUTPATIENT
Start: 2023-01-05 | End: 2023-01-07

## 2023-01-05 RX ADMIN — ALBUTEROL 2.5 MILLIGRAM(S): 90 AEROSOL, METERED ORAL at 04:41

## 2023-01-05 RX ADMIN — Medication 125 MILLILITER(S): at 12:13

## 2023-01-05 RX ADMIN — FAMOTIDINE 20 MILLIGRAM(S): 10 INJECTION INTRAVENOUS at 05:49

## 2023-01-05 RX ADMIN — ALBUTEROL 2.5 MILLIGRAM(S): 90 AEROSOL, METERED ORAL at 15:31

## 2023-01-05 RX ADMIN — LIDOCAINE 1 PATCH: 4 CREAM TOPICAL at 19:49

## 2023-01-05 RX ADMIN — LIDOCAINE 1 PATCH: 4 CREAM TOPICAL at 23:12

## 2023-01-05 RX ADMIN — HYDROMORPHONE HYDROCHLORIDE 30 MILLILITER(S): 2 INJECTION INTRAMUSCULAR; INTRAVENOUS; SUBCUTANEOUS at 07:19

## 2023-01-05 RX ADMIN — ALBUTEROL 2.5 MILLIGRAM(S): 90 AEROSOL, METERED ORAL at 09:02

## 2023-01-05 RX ADMIN — Medication 15 MILLIGRAM(S): at 08:29

## 2023-01-05 RX ADMIN — Medication 15 MILLIGRAM(S): at 20:00

## 2023-01-05 RX ADMIN — HEPARIN SODIUM 5000 UNIT(S): 5000 INJECTION INTRAVENOUS; SUBCUTANEOUS at 05:51

## 2023-01-05 RX ADMIN — FAMOTIDINE 20 MILLIGRAM(S): 10 INJECTION INTRAVENOUS at 18:17

## 2023-01-05 RX ADMIN — Medication 15 MILLIGRAM(S): at 14:32

## 2023-01-05 RX ADMIN — SODIUM CHLORIDE 4 MILLILITER(S): 9 INJECTION INTRAMUSCULAR; INTRAVENOUS; SUBCUTANEOUS at 09:04

## 2023-01-05 RX ADMIN — Medication 200 MILLILITER(S): at 11:59

## 2023-01-05 RX ADMIN — Medication 200 MILLILITER(S): at 19:11

## 2023-01-05 RX ADMIN — Medication 15 MILLIGRAM(S): at 20:11

## 2023-01-05 RX ADMIN — SODIUM CHLORIDE 4 MILLILITER(S): 9 INJECTION INTRAMUSCULAR; INTRAVENOUS; SUBCUTANEOUS at 15:31

## 2023-01-05 RX ADMIN — HEPARIN SODIUM 5000 UNIT(S): 5000 INJECTION INTRAVENOUS; SUBCUTANEOUS at 18:17

## 2023-01-05 RX ADMIN — Medication 200 MILLILITER(S): at 07:16

## 2023-01-05 RX ADMIN — LIDOCAINE 1 PATCH: 4 CREAM TOPICAL at 11:28

## 2023-01-05 RX ADMIN — SODIUM CHLORIDE 4 MILLILITER(S): 9 INJECTION INTRAMUSCULAR; INTRAVENOUS; SUBCUTANEOUS at 04:41

## 2023-01-05 RX ADMIN — Medication 15 MILLIGRAM(S): at 09:37

## 2023-01-05 RX ADMIN — POLYETHYLENE GLYCOL 3350 17 GRAM(S): 17 POWDER, FOR SOLUTION ORAL at 11:28

## 2023-01-05 RX ADMIN — SENNA PLUS 2 TABLET(S): 8.6 TABLET ORAL at 21:02

## 2023-01-05 NOTE — DIETITIAN INITIAL EVALUATION ADULT - PERTINENT MEDS FT
MEDICATIONS  (STANDING):  acetaminophen   IVPB .. 750 milliGRAM(s) IV Intermittent once  albuterol    0.083% 2.5 milliGRAM(s) Nebulizer every 8 hours  famotidine    Tablet 20 milliGRAM(s) Oral every 12 hours  heparin   Injectable 5000 Unit(s) SubCutaneous every 12 hours  ketorolac   Injectable 15 milliGRAM(s) IV Push every 6 hours  lactated ringers. 1000 milliLiter(s) (30 mL/Hr) IV Continuous <Continuous>  lidocaine   4% Patch 1 Patch Transdermal daily  polyethylene glycol 3350 17 Gram(s) Oral daily  ropivacaine 0.2% in Sodium Chloride PCEA 200 milliLiter(s) Epidural PCA Continuous  senna 2 Tablet(s) Oral at bedtime  sodium chloride 3%  Inhalation 4 milliLiter(s) Inhalation every 8 hours    MEDICATIONS  (PRN):  HYDROmorphone  Injectable 0.3 milliGRAM(s) IV Push every 3 hours PRN Severe Breakthrough Pain (7 - 10)  metoclopramide Injectable 10 milliGRAM(s) IV Push every 8 hours PRN nausea and/or vomitting  naloxone Injectable 0.1 milliGRAM(s) IV Push every 3 minutes PRN For ANY of the following changes in patient status:  A. RR LESS THAN 10 breaths per minute, B. Oxygen saturation LESS THAN 90%, C. Sedation score of 6  ondansetron Injectable 4 milliGRAM(s) IV Push every 6 hours PRN Nausea  oxyCODONE    IR 5 milliGRAM(s) Oral every 3 hours PRN Moderate-Severe Pain (4 - 10)

## 2023-01-05 NOTE — DIETITIAN INITIAL EVALUATION ADULT - OTHER INFO
63 y/o Mandarin speaking female with h/o Thymic  cancer ; tx with chemo / RT completed 9/22. Pt is s/p En bloc thymectomy with partial sternectomy and resection of anterior right ribs 2-4 and wedge resection of right middle lobe. Visited with pt and family at bedside to obtain nutrition hx. Family acted as  for patient. Family said that pt isn't eating well due to preference towards traditional Chinese prepared foods and not liking hospital foods. Offered to take food preferences, however family said pt declined. Family stated pt denies food allergies, nausea/vomiting/diarrhea/constipation, or issues with chewing/swallowing; no BMs since admission - consider bowel regimen as appropriate. Family reported that pt had a stable wt status PTA. Offered oral supplementation to enhance oral intake; was told that pt declined. Provided family with this RDN's name and pager number should any questions or concerns come up. RDN services to remain available as needed.

## 2023-01-05 NOTE — DIETITIAN INITIAL EVALUATION ADULT - NSFNSGIIOFT_GEN_A_CORE
01-04-23 @ 07:01  -  01-05-23 @ 07:00  --------------------------------------------------------  OUT:    Chest Tube (mL): 120 mL    Chest Tube (mL): 70 mL  Total OUT: 190 mL    Total NET: -190 mL      01-05-23 @ 07:01 - 01-05-23 @ 15:41  --------------------------------------------------------  OUT:    Chest Tube (mL): 0 mL    Chest Tube (mL): 10 mL  Total OUT: 10 mL    Total NET: -10 mL

## 2023-01-05 NOTE — DIETITIAN INITIAL EVALUATION ADULT - PERTINENT LABORATORY DATA
01-05    137  |  100  |  10  ----------------------------<  109<H>  4.4   |  29  |  0.65    Ca    8.6      05 Jan 2023 04:26  Phos  2.2     01-05  Mg     2.00     01-05    TPro  6.8  /  Alb  3.8  /  TBili  0.4  /  DBili  x   /  AST  28  /  ALT  13  /  AlkPhos  53  01-04

## 2023-01-05 NOTE — PROGRESS NOTE ADULT - ASSESSMENT
A: Mack is a 63yo F who is now s/p thymic mass resection by CT surgery and bilateral pec flap advancement by PRS on 1/3/23. Patient is recovering well.     Plan:   -Monitor I&Os, ARCHIE output, can keep ARCHIE to wall suction  -No abduction above shoulders  -Pain: PCEA  -Ppx: SQH  -Rest of care per primary team    Andrea Lopezsh  Plastic Surgery  #[id number] [facility] pager  [phone] [facility] pager  Available on teams

## 2023-01-06 LAB
ANION GAP SERPL CALC-SCNC: 11 MMOL/L — SIGNIFICANT CHANGE UP (ref 7–14)
APTT BLD: 30.5 SEC — SIGNIFICANT CHANGE UP (ref 27–36.3)
BUN SERPL-MCNC: 13 MG/DL — SIGNIFICANT CHANGE UP (ref 7–23)
CALCIUM SERPL-MCNC: 8.6 MG/DL — SIGNIFICANT CHANGE UP (ref 8.4–10.5)
CHLORIDE SERPL-SCNC: 104 MMOL/L — SIGNIFICANT CHANGE UP (ref 98–107)
CO2 SERPL-SCNC: 25 MMOL/L — SIGNIFICANT CHANGE UP (ref 22–31)
CREAT SERPL-MCNC: 0.58 MG/DL — SIGNIFICANT CHANGE UP (ref 0.5–1.3)
EGFR: 102 ML/MIN/1.73M2 — SIGNIFICANT CHANGE UP
GLUCOSE SERPL-MCNC: 103 MG/DL — HIGH (ref 70–99)
HCT VFR BLD CALC: 32.5 % — LOW (ref 34.5–45)
HGB BLD-MCNC: 10.6 G/DL — LOW (ref 11.5–15.5)
INR BLD: 1.06 RATIO — SIGNIFICANT CHANGE UP (ref 0.88–1.16)
MAGNESIUM SERPL-MCNC: 2.2 MG/DL — SIGNIFICANT CHANGE UP (ref 1.6–2.6)
MCHC RBC-ENTMCNC: 32.6 GM/DL — SIGNIFICANT CHANGE UP (ref 32–36)
MCHC RBC-ENTMCNC: 33.1 PG — SIGNIFICANT CHANGE UP (ref 27–34)
MCV RBC AUTO: 101.6 FL — HIGH (ref 80–100)
NRBC # BLD: 0 /100 WBCS — SIGNIFICANT CHANGE UP (ref 0–0)
NRBC # FLD: 0 K/UL — SIGNIFICANT CHANGE UP (ref 0–0)
PHOSPHATE SERPL-MCNC: 2.7 MG/DL — SIGNIFICANT CHANGE UP (ref 2.5–4.5)
PLATELET # BLD AUTO: 163 K/UL — SIGNIFICANT CHANGE UP (ref 150–400)
POTASSIUM SERPL-MCNC: 4.3 MMOL/L — SIGNIFICANT CHANGE UP (ref 3.5–5.3)
POTASSIUM SERPL-SCNC: 4.3 MMOL/L — SIGNIFICANT CHANGE UP (ref 3.5–5.3)
PROTHROM AB SERPL-ACNC: 12.3 SEC — SIGNIFICANT CHANGE UP (ref 10.5–13.4)
RBC # BLD: 3.2 M/UL — LOW (ref 3.8–5.2)
RBC # FLD: 11.4 % — SIGNIFICANT CHANGE UP (ref 10.3–14.5)
SODIUM SERPL-SCNC: 140 MMOL/L — SIGNIFICANT CHANGE UP (ref 135–145)
WBC # BLD: 8.12 K/UL — SIGNIFICANT CHANGE UP (ref 3.8–10.5)
WBC # FLD AUTO: 8.12 K/UL — SIGNIFICANT CHANGE UP (ref 3.8–10.5)

## 2023-01-06 PROCEDURE — 71045 X-RAY EXAM CHEST 1 VIEW: CPT | Mod: 26,77

## 2023-01-06 PROCEDURE — 99233 SBSQ HOSP IP/OBS HIGH 50: CPT

## 2023-01-06 PROCEDURE — 71045 X-RAY EXAM CHEST 1 VIEW: CPT | Mod: 26

## 2023-01-06 RX ORDER — ACETAMINOPHEN 500 MG
750 TABLET ORAL EVERY 6 HOURS
Refills: 0 | Status: COMPLETED | OUTPATIENT
Start: 2023-01-06 | End: 2023-01-07

## 2023-01-06 RX ADMIN — SODIUM CHLORIDE 4 MILLILITER(S): 9 INJECTION INTRAMUSCULAR; INTRAVENOUS; SUBCUTANEOUS at 15:48

## 2023-01-06 RX ADMIN — ALBUTEROL 2.5 MILLIGRAM(S): 90 AEROSOL, METERED ORAL at 15:48

## 2023-01-06 RX ADMIN — SODIUM CHLORIDE 4 MILLILITER(S): 9 INJECTION INTRAMUSCULAR; INTRAVENOUS; SUBCUTANEOUS at 04:01

## 2023-01-06 RX ADMIN — HEPARIN SODIUM 5000 UNIT(S): 5000 INJECTION INTRAVENOUS; SUBCUTANEOUS at 18:57

## 2023-01-06 RX ADMIN — Medication 15 MILLIGRAM(S): at 21:32

## 2023-01-06 RX ADMIN — Medication 15 MILLIGRAM(S): at 14:24

## 2023-01-06 RX ADMIN — FAMOTIDINE 20 MILLIGRAM(S): 10 INJECTION INTRAVENOUS at 18:56

## 2023-01-06 RX ADMIN — Medication 300 MILLIGRAM(S): at 18:56

## 2023-01-06 RX ADMIN — Medication 15 MILLIGRAM(S): at 09:52

## 2023-01-06 RX ADMIN — LIDOCAINE 1 PATCH: 4 CREAM TOPICAL at 20:39

## 2023-01-06 RX ADMIN — Medication 750 MILLIGRAM(S): at 13:30

## 2023-01-06 RX ADMIN — SODIUM CHLORIDE 4 MILLILITER(S): 9 INJECTION INTRAMUSCULAR; INTRAVENOUS; SUBCUTANEOUS at 07:44

## 2023-01-06 RX ADMIN — HEPARIN SODIUM 5000 UNIT(S): 5000 INJECTION INTRAVENOUS; SUBCUTANEOUS at 05:02

## 2023-01-06 RX ADMIN — Medication 15 MILLIGRAM(S): at 02:26

## 2023-01-06 RX ADMIN — LIDOCAINE 1 PATCH: 4 CREAM TOPICAL at 13:01

## 2023-01-06 RX ADMIN — SODIUM CHLORIDE 4 MILLILITER(S): 9 INJECTION INTRAMUSCULAR; INTRAVENOUS; SUBCUTANEOUS at 22:10

## 2023-01-06 RX ADMIN — ALBUTEROL 2.5 MILLIGRAM(S): 90 AEROSOL, METERED ORAL at 04:01

## 2023-01-06 RX ADMIN — Medication 300 MILLIGRAM(S): at 13:00

## 2023-01-06 RX ADMIN — Medication 200 MILLILITER(S): at 07:06

## 2023-01-06 RX ADMIN — ALBUTEROL 2.5 MILLIGRAM(S): 90 AEROSOL, METERED ORAL at 22:10

## 2023-01-06 RX ADMIN — ALBUTEROL 2.5 MILLIGRAM(S): 90 AEROSOL, METERED ORAL at 07:44

## 2023-01-06 RX ADMIN — Medication 200 MILLILITER(S): at 08:38

## 2023-01-06 RX ADMIN — Medication 15 MILLIGRAM(S): at 03:28

## 2023-01-06 RX ADMIN — Medication 15 MILLIGRAM(S): at 22:00

## 2023-01-06 RX ADMIN — Medication 15 MILLIGRAM(S): at 14:09

## 2023-01-06 RX ADMIN — FAMOTIDINE 20 MILLIGRAM(S): 10 INJECTION INTRAVENOUS at 05:02

## 2023-01-06 RX ADMIN — SENNA PLUS 2 TABLET(S): 8.6 TABLET ORAL at 21:33

## 2023-01-06 RX ADMIN — Medication 750 MILLIGRAM(S): at 19:00

## 2023-01-06 RX ADMIN — Medication 15 MILLIGRAM(S): at 09:37

## 2023-01-06 RX ADMIN — POLYETHYLENE GLYCOL 3350 17 GRAM(S): 17 POWDER, FOR SOLUTION ORAL at 13:01

## 2023-01-06 NOTE — PROGRESS NOTE ADULT - ASSESSMENT
A: Mack is a 61yo F who is now s/p thymic mass resection by CT surgery and bilateral pec flap advancement by PRS on 1/3/23. Patient is recovering well.     Plan:   -Monitor I&Os, ARCHIE output, can keep ARCHIE to wall suction  -No abduction above shoulders  -Pain: PCEA  -Ppx: SQH  -Rest of care per primary team    Andrea Lopezsh  Plastic Surgery  #[id number] [facility] pager  [phone] [facility] pager  Available on teams

## 2023-01-07 LAB
ANION GAP SERPL CALC-SCNC: 13 MMOL/L — SIGNIFICANT CHANGE UP (ref 7–14)
APTT BLD: 27.4 SEC — SIGNIFICANT CHANGE UP (ref 27–36.3)
BUN SERPL-MCNC: 13 MG/DL — SIGNIFICANT CHANGE UP (ref 7–23)
CALCIUM SERPL-MCNC: 8.8 MG/DL — SIGNIFICANT CHANGE UP (ref 8.4–10.5)
CHLORIDE SERPL-SCNC: 101 MMOL/L — SIGNIFICANT CHANGE UP (ref 98–107)
CO2 SERPL-SCNC: 22 MMOL/L — SIGNIFICANT CHANGE UP (ref 22–31)
CREAT SERPL-MCNC: 0.48 MG/DL — LOW (ref 0.5–1.3)
EGFR: 106 ML/MIN/1.73M2 — SIGNIFICANT CHANGE UP
GLUCOSE SERPL-MCNC: 107 MG/DL — HIGH (ref 70–99)
HCT VFR BLD CALC: 33.6 % — LOW (ref 34.5–45)
HGB BLD-MCNC: 10.8 G/DL — LOW (ref 11.5–15.5)
INR BLD: 0.99 RATIO — SIGNIFICANT CHANGE UP (ref 0.88–1.16)
MAGNESIUM SERPL-MCNC: 2.1 MG/DL — SIGNIFICANT CHANGE UP (ref 1.6–2.6)
MCHC RBC-ENTMCNC: 32 PG — SIGNIFICANT CHANGE UP (ref 27–34)
MCHC RBC-ENTMCNC: 32.1 GM/DL — SIGNIFICANT CHANGE UP (ref 32–36)
MCV RBC AUTO: 99.7 FL — SIGNIFICANT CHANGE UP (ref 80–100)
NRBC # BLD: 0 /100 WBCS — SIGNIFICANT CHANGE UP (ref 0–0)
NRBC # FLD: 0 K/UL — SIGNIFICANT CHANGE UP (ref 0–0)
PHOSPHATE SERPL-MCNC: 4.1 MG/DL — SIGNIFICANT CHANGE UP (ref 2.5–4.5)
PLATELET # BLD AUTO: 189 K/UL — SIGNIFICANT CHANGE UP (ref 150–400)
POTASSIUM SERPL-MCNC: 5.2 MMOL/L — SIGNIFICANT CHANGE UP (ref 3.5–5.3)
POTASSIUM SERPL-SCNC: 5.2 MMOL/L — SIGNIFICANT CHANGE UP (ref 3.5–5.3)
PROTHROM AB SERPL-ACNC: 11.5 SEC — SIGNIFICANT CHANGE UP (ref 10.5–13.4)
RBC # BLD: 3.37 M/UL — LOW (ref 3.8–5.2)
RBC # FLD: 11.4 % — SIGNIFICANT CHANGE UP (ref 10.3–14.5)
SODIUM SERPL-SCNC: 136 MMOL/L — SIGNIFICANT CHANGE UP (ref 135–145)
WBC # BLD: 4.87 K/UL — SIGNIFICANT CHANGE UP (ref 3.8–10.5)
WBC # FLD AUTO: 4.87 K/UL — SIGNIFICANT CHANGE UP (ref 3.8–10.5)

## 2023-01-07 PROCEDURE — 71045 X-RAY EXAM CHEST 1 VIEW: CPT | Mod: 26

## 2023-01-07 PROCEDURE — 99233 SBSQ HOSP IP/OBS HIGH 50: CPT

## 2023-01-07 RX ORDER — HYDROMORPHONE HYDROCHLORIDE 2 MG/ML
0.3 INJECTION INTRAMUSCULAR; INTRAVENOUS; SUBCUTANEOUS
Refills: 0 | Status: DISCONTINUED | OUTPATIENT
Start: 2023-01-07 | End: 2023-01-10

## 2023-01-07 RX ORDER — INFLUENZA VIRUS VACCINE 15; 15; 15; 15 UG/.5ML; UG/.5ML; UG/.5ML; UG/.5ML
0.5 SUSPENSION INTRAMUSCULAR ONCE
Refills: 0 | Status: COMPLETED | OUTPATIENT
Start: 2023-01-07 | End: 2023-01-07

## 2023-01-07 RX ORDER — KETOROLAC TROMETHAMINE 30 MG/ML
15 SYRINGE (ML) INJECTION EVERY 6 HOURS
Refills: 0 | Status: DISCONTINUED | OUTPATIENT
Start: 2023-01-07 | End: 2023-01-09

## 2023-01-07 RX ADMIN — FAMOTIDINE 20 MILLIGRAM(S): 10 INJECTION INTRAVENOUS at 17:22

## 2023-01-07 RX ADMIN — Medication 15 MILLIGRAM(S): at 17:21

## 2023-01-07 RX ADMIN — SODIUM CHLORIDE 4 MILLILITER(S): 9 INJECTION INTRAMUSCULAR; INTRAVENOUS; SUBCUTANEOUS at 21:37

## 2023-01-07 RX ADMIN — OXYCODONE HYDROCHLORIDE 5 MILLIGRAM(S): 5 TABLET ORAL at 17:22

## 2023-01-07 RX ADMIN — FAMOTIDINE 20 MILLIGRAM(S): 10 INJECTION INTRAVENOUS at 05:04

## 2023-01-07 RX ADMIN — Medication 300 MILLIGRAM(S): at 01:43

## 2023-01-07 RX ADMIN — HEPARIN SODIUM 5000 UNIT(S): 5000 INJECTION INTRAVENOUS; SUBCUTANEOUS at 17:21

## 2023-01-07 RX ADMIN — Medication 15 MILLIGRAM(S): at 22:22

## 2023-01-07 RX ADMIN — Medication 15 MILLIGRAM(S): at 11:30

## 2023-01-07 RX ADMIN — SODIUM CHLORIDE 4 MILLILITER(S): 9 INJECTION INTRAMUSCULAR; INTRAVENOUS; SUBCUTANEOUS at 15:47

## 2023-01-07 RX ADMIN — OXYCODONE HYDROCHLORIDE 5 MILLIGRAM(S): 5 TABLET ORAL at 12:20

## 2023-01-07 RX ADMIN — ALBUTEROL 2.5 MILLIGRAM(S): 90 AEROSOL, METERED ORAL at 15:47

## 2023-01-07 RX ADMIN — ALBUTEROL 2.5 MILLIGRAM(S): 90 AEROSOL, METERED ORAL at 07:42

## 2023-01-07 RX ADMIN — Medication 15 MILLIGRAM(S): at 05:04

## 2023-01-07 RX ADMIN — LIDOCAINE 1 PATCH: 4 CREAM TOPICAL at 12:20

## 2023-01-07 RX ADMIN — Medication 750 MILLIGRAM(S): at 02:00

## 2023-01-07 RX ADMIN — Medication 15 MILLIGRAM(S): at 17:50

## 2023-01-07 RX ADMIN — LIDOCAINE 1 PATCH: 4 CREAM TOPICAL at 17:52

## 2023-01-07 RX ADMIN — OXYCODONE HYDROCHLORIDE 5 MILLIGRAM(S): 5 TABLET ORAL at 17:50

## 2023-01-07 RX ADMIN — Medication 15 MILLIGRAM(S): at 05:30

## 2023-01-07 RX ADMIN — Medication 15 MILLIGRAM(S): at 11:45

## 2023-01-07 RX ADMIN — LIDOCAINE 1 PATCH: 4 CREAM TOPICAL at 01:00

## 2023-01-07 RX ADMIN — POLYETHYLENE GLYCOL 3350 17 GRAM(S): 17 POWDER, FOR SOLUTION ORAL at 12:20

## 2023-01-07 RX ADMIN — SODIUM CHLORIDE 4 MILLILITER(S): 9 INJECTION INTRAMUSCULAR; INTRAVENOUS; SUBCUTANEOUS at 07:42

## 2023-01-07 RX ADMIN — ALBUTEROL 2.5 MILLIGRAM(S): 90 AEROSOL, METERED ORAL at 21:37

## 2023-01-07 RX ADMIN — SENNA PLUS 2 TABLET(S): 8.6 TABLET ORAL at 22:23

## 2023-01-08 LAB
ANION GAP SERPL CALC-SCNC: 11 MMOL/L — SIGNIFICANT CHANGE UP (ref 7–14)
APTT BLD: 31.1 SEC — SIGNIFICANT CHANGE UP (ref 27–36.3)
BUN SERPL-MCNC: 13 MG/DL — SIGNIFICANT CHANGE UP (ref 7–23)
CALCIUM SERPL-MCNC: 9 MG/DL — SIGNIFICANT CHANGE UP (ref 8.4–10.5)
CHLORIDE SERPL-SCNC: 101 MMOL/L — SIGNIFICANT CHANGE UP (ref 98–107)
CO2 SERPL-SCNC: 25 MMOL/L — SIGNIFICANT CHANGE UP (ref 22–31)
CREAT SERPL-MCNC: 0.55 MG/DL — SIGNIFICANT CHANGE UP (ref 0.5–1.3)
EGFR: 103 ML/MIN/1.73M2 — SIGNIFICANT CHANGE UP
GLUCOSE SERPL-MCNC: 115 MG/DL — HIGH (ref 70–99)
HCT VFR BLD CALC: 34.7 % — SIGNIFICANT CHANGE UP (ref 34.5–45)
HGB BLD-MCNC: 11.5 G/DL — SIGNIFICANT CHANGE UP (ref 11.5–15.5)
INR BLD: 0.96 RATIO — SIGNIFICANT CHANGE UP (ref 0.88–1.16)
MAGNESIUM SERPL-MCNC: 2.2 MG/DL — SIGNIFICANT CHANGE UP (ref 1.6–2.6)
MCHC RBC-ENTMCNC: 32.4 PG — SIGNIFICANT CHANGE UP (ref 27–34)
MCHC RBC-ENTMCNC: 33.1 GM/DL — SIGNIFICANT CHANGE UP (ref 32–36)
MCV RBC AUTO: 97.7 FL — SIGNIFICANT CHANGE UP (ref 80–100)
NRBC # BLD: 0 /100 WBCS — SIGNIFICANT CHANGE UP (ref 0–0)
NRBC # FLD: 0 K/UL — SIGNIFICANT CHANGE UP (ref 0–0)
PHOSPHATE SERPL-MCNC: 4.6 MG/DL — HIGH (ref 2.5–4.5)
PLATELET # BLD AUTO: 220 K/UL — SIGNIFICANT CHANGE UP (ref 150–400)
POTASSIUM SERPL-MCNC: 4.3 MMOL/L — SIGNIFICANT CHANGE UP (ref 3.5–5.3)
POTASSIUM SERPL-SCNC: 4.3 MMOL/L — SIGNIFICANT CHANGE UP (ref 3.5–5.3)
PROTHROM AB SERPL-ACNC: 11.1 SEC — SIGNIFICANT CHANGE UP (ref 10.5–13.4)
RBC # BLD: 3.55 M/UL — LOW (ref 3.8–5.2)
RBC # FLD: 11.3 % — SIGNIFICANT CHANGE UP (ref 10.3–14.5)
SODIUM SERPL-SCNC: 137 MMOL/L — SIGNIFICANT CHANGE UP (ref 135–145)
WBC # BLD: 3.68 K/UL — LOW (ref 3.8–10.5)
WBC # FLD AUTO: 3.68 K/UL — LOW (ref 3.8–10.5)

## 2023-01-08 PROCEDURE — 99233 SBSQ HOSP IP/OBS HIGH 50: CPT

## 2023-01-08 PROCEDURE — 71045 X-RAY EXAM CHEST 1 VIEW: CPT | Mod: 26

## 2023-01-08 RX ORDER — METOPROLOL TARTRATE 50 MG
12.5 TABLET ORAL ONCE
Refills: 0 | Status: COMPLETED | OUTPATIENT
Start: 2023-01-08 | End: 2023-01-08

## 2023-01-08 RX ORDER — LEVALBUTEROL 1.25 MG/.5ML
0.63 SOLUTION, CONCENTRATE RESPIRATORY (INHALATION) EVERY 6 HOURS
Refills: 0 | Status: DISCONTINUED | OUTPATIENT
Start: 2023-01-08 | End: 2023-01-10

## 2023-01-08 RX ADMIN — POLYETHYLENE GLYCOL 3350 17 GRAM(S): 17 POWDER, FOR SOLUTION ORAL at 11:40

## 2023-01-08 RX ADMIN — Medication 15 MILLIGRAM(S): at 11:40

## 2023-01-08 RX ADMIN — HEPARIN SODIUM 5000 UNIT(S): 5000 INJECTION INTRAVENOUS; SUBCUTANEOUS at 17:02

## 2023-01-08 RX ADMIN — Medication 15 MILLIGRAM(S): at 11:55

## 2023-01-08 RX ADMIN — Medication 15 MILLIGRAM(S): at 17:02

## 2023-01-08 RX ADMIN — LIDOCAINE 1 PATCH: 4 CREAM TOPICAL at 00:00

## 2023-01-08 RX ADMIN — OXYCODONE HYDROCHLORIDE 5 MILLIGRAM(S): 5 TABLET ORAL at 08:45

## 2023-01-08 RX ADMIN — LIDOCAINE 1 PATCH: 4 CREAM TOPICAL at 18:23

## 2023-01-08 RX ADMIN — FAMOTIDINE 20 MILLIGRAM(S): 10 INJECTION INTRAVENOUS at 17:02

## 2023-01-08 RX ADMIN — HEPARIN SODIUM 5000 UNIT(S): 5000 INJECTION INTRAVENOUS; SUBCUTANEOUS at 05:38

## 2023-01-08 RX ADMIN — SODIUM CHLORIDE 4 MILLILITER(S): 9 INJECTION INTRAMUSCULAR; INTRAVENOUS; SUBCUTANEOUS at 15:32

## 2023-01-08 RX ADMIN — Medication 15 MILLIGRAM(S): at 00:00

## 2023-01-08 RX ADMIN — LIDOCAINE 1 PATCH: 4 CREAM TOPICAL at 11:39

## 2023-01-08 RX ADMIN — SODIUM CHLORIDE 4 MILLILITER(S): 9 INJECTION INTRAMUSCULAR; INTRAVENOUS; SUBCUTANEOUS at 07:26

## 2023-01-08 RX ADMIN — SENNA PLUS 2 TABLET(S): 8.6 TABLET ORAL at 21:45

## 2023-01-08 RX ADMIN — FAMOTIDINE 20 MILLIGRAM(S): 10 INJECTION INTRAVENOUS at 05:38

## 2023-01-08 RX ADMIN — Medication 15 MILLIGRAM(S): at 17:17

## 2023-01-08 RX ADMIN — OXYCODONE HYDROCHLORIDE 5 MILLIGRAM(S): 5 TABLET ORAL at 09:15

## 2023-01-08 RX ADMIN — ALBUTEROL 2.5 MILLIGRAM(S): 90 AEROSOL, METERED ORAL at 07:26

## 2023-01-08 RX ADMIN — LIDOCAINE 1 PATCH: 4 CREAM TOPICAL at 22:58

## 2023-01-08 RX ADMIN — Medication 15 MILLIGRAM(S): at 23:38

## 2023-01-08 RX ADMIN — Medication 15 MILLIGRAM(S): at 05:38

## 2023-01-08 RX ADMIN — ALBUTEROL 2.5 MILLIGRAM(S): 90 AEROSOL, METERED ORAL at 15:32

## 2023-01-08 RX ADMIN — Medication 12.5 MILLIGRAM(S): at 18:01

## 2023-01-08 RX ADMIN — SODIUM CHLORIDE 4 MILLILITER(S): 9 INJECTION INTRAMUSCULAR; INTRAVENOUS; SUBCUTANEOUS at 21:45

## 2023-01-09 ENCOUNTER — TRANSCRIPTION ENCOUNTER (OUTPATIENT)
Age: 63
End: 2023-01-09

## 2023-01-09 LAB
ANION GAP SERPL CALC-SCNC: 9 MMOL/L — SIGNIFICANT CHANGE UP (ref 7–14)
BUN SERPL-MCNC: 13 MG/DL — SIGNIFICANT CHANGE UP (ref 7–23)
CALCIUM SERPL-MCNC: 9.2 MG/DL — SIGNIFICANT CHANGE UP (ref 8.4–10.5)
CHLORIDE SERPL-SCNC: 99 MMOL/L — SIGNIFICANT CHANGE UP (ref 98–107)
CO2 SERPL-SCNC: 25 MMOL/L — SIGNIFICANT CHANGE UP (ref 22–31)
CREAT SERPL-MCNC: 0.65 MG/DL — SIGNIFICANT CHANGE UP (ref 0.5–1.3)
EGFR: 99 ML/MIN/1.73M2 — SIGNIFICANT CHANGE UP
GLUCOSE SERPL-MCNC: 116 MG/DL — HIGH (ref 70–99)
HCT VFR BLD CALC: 35.1 % — SIGNIFICANT CHANGE UP (ref 34.5–45)
HGB BLD-MCNC: 11.5 G/DL — SIGNIFICANT CHANGE UP (ref 11.5–15.5)
MAGNESIUM SERPL-MCNC: 2.3 MG/DL — SIGNIFICANT CHANGE UP (ref 1.6–2.6)
MCHC RBC-ENTMCNC: 32 PG — SIGNIFICANT CHANGE UP (ref 27–34)
MCHC RBC-ENTMCNC: 32.8 GM/DL — SIGNIFICANT CHANGE UP (ref 32–36)
MCV RBC AUTO: 97.8 FL — SIGNIFICANT CHANGE UP (ref 80–100)
NRBC # BLD: 0 /100 WBCS — SIGNIFICANT CHANGE UP (ref 0–0)
NRBC # FLD: 0 K/UL — SIGNIFICANT CHANGE UP (ref 0–0)
PHOSPHATE SERPL-MCNC: 5.4 MG/DL — HIGH (ref 2.5–4.5)
PLATELET # BLD AUTO: 222 K/UL — SIGNIFICANT CHANGE UP (ref 150–400)
POTASSIUM SERPL-MCNC: 4.3 MMOL/L — SIGNIFICANT CHANGE UP (ref 3.5–5.3)
POTASSIUM SERPL-SCNC: 4.3 MMOL/L — SIGNIFICANT CHANGE UP (ref 3.5–5.3)
RBC # BLD: 3.59 M/UL — LOW (ref 3.8–5.2)
RBC # FLD: 11.4 % — SIGNIFICANT CHANGE UP (ref 10.3–14.5)
SODIUM SERPL-SCNC: 133 MMOL/L — LOW (ref 135–145)
WBC # BLD: 3.86 K/UL — SIGNIFICANT CHANGE UP (ref 3.8–10.5)
WBC # FLD AUTO: 3.86 K/UL — SIGNIFICANT CHANGE UP (ref 3.8–10.5)

## 2023-01-09 PROCEDURE — 71045 X-RAY EXAM CHEST 1 VIEW: CPT | Mod: 26

## 2023-01-09 PROCEDURE — 99233 SBSQ HOSP IP/OBS HIGH 50: CPT

## 2023-01-09 RX ORDER — KETOROLAC TROMETHAMINE 30 MG/ML
15 SYRINGE (ML) INJECTION EVERY 6 HOURS
Refills: 0 | Status: DISCONTINUED | OUTPATIENT
Start: 2023-01-09 | End: 2023-01-10

## 2023-01-09 RX ADMIN — SODIUM CHLORIDE 4 MILLILITER(S): 9 INJECTION INTRAMUSCULAR; INTRAVENOUS; SUBCUTANEOUS at 08:56

## 2023-01-09 RX ADMIN — SODIUM CHLORIDE 4 MILLILITER(S): 9 INJECTION INTRAMUSCULAR; INTRAVENOUS; SUBCUTANEOUS at 17:14

## 2023-01-09 RX ADMIN — Medication 15 MILLIGRAM(S): at 06:25

## 2023-01-09 RX ADMIN — OXYCODONE HYDROCHLORIDE 5 MILLIGRAM(S): 5 TABLET ORAL at 12:30

## 2023-01-09 RX ADMIN — HEPARIN SODIUM 5000 UNIT(S): 5000 INJECTION INTRAVENOUS; SUBCUTANEOUS at 06:25

## 2023-01-09 RX ADMIN — HYDROMORPHONE HYDROCHLORIDE 0.3 MILLIGRAM(S): 2 INJECTION INTRAMUSCULAR; INTRAVENOUS; SUBCUTANEOUS at 17:00

## 2023-01-09 RX ADMIN — HYDROMORPHONE HYDROCHLORIDE 0.3 MILLIGRAM(S): 2 INJECTION INTRAMUSCULAR; INTRAVENOUS; SUBCUTANEOUS at 17:15

## 2023-01-09 RX ADMIN — LIDOCAINE 1 PATCH: 4 CREAM TOPICAL at 11:45

## 2023-01-09 RX ADMIN — FAMOTIDINE 20 MILLIGRAM(S): 10 INJECTION INTRAVENOUS at 19:09

## 2023-01-09 RX ADMIN — FAMOTIDINE 20 MILLIGRAM(S): 10 INJECTION INTRAVENOUS at 06:25

## 2023-01-09 RX ADMIN — OXYCODONE HYDROCHLORIDE 5 MILLIGRAM(S): 5 TABLET ORAL at 11:45

## 2023-01-09 RX ADMIN — LIDOCAINE 1 PATCH: 4 CREAM TOPICAL at 23:40

## 2023-01-09 RX ADMIN — Medication 15 MILLIGRAM(S): at 06:40

## 2023-01-09 RX ADMIN — HEPARIN SODIUM 5000 UNIT(S): 5000 INJECTION INTRAVENOUS; SUBCUTANEOUS at 19:09

## 2023-01-09 RX ADMIN — LEVALBUTEROL 0.63 MILLIGRAM(S): 1.25 SOLUTION, CONCENTRATE RESPIRATORY (INHALATION) at 08:55

## 2023-01-09 RX ADMIN — Medication 15 MILLIGRAM(S): at 00:12

## 2023-01-09 RX ADMIN — LEVALBUTEROL 0.63 MILLIGRAM(S): 1.25 SOLUTION, CONCENTRATE RESPIRATORY (INHALATION) at 17:13

## 2023-01-09 RX ADMIN — Medication 15 MILLIGRAM(S): at 23:11

## 2023-01-09 RX ADMIN — POLYETHYLENE GLYCOL 3350 17 GRAM(S): 17 POWDER, FOR SOLUTION ORAL at 12:25

## 2023-01-09 NOTE — PROGRESS NOTE ADULT - REASON FOR ADMISSION
Sternectomy, en/bloc anterior mediastinal mass resection with chest reconstruction
s/p thymic mass resection
s/p thymic mass resection and bilateral pec flap advancement
s/p thymic mass excision and b/l pectoral advancement flaps with midline closure

## 2023-01-09 NOTE — PROGRESS NOTE ADULT - ASSESSMENT
A: Mack is a 61yo F who is now s/p thymic mass resection by CT surgery and bilateral pec flap advancement by PRS on 1/3/23. Patient is recovering well.     Plan:   -Monitor I&Os, ARCHIE output, can keep ARCHIE to wall suction  -No abduction above shoulder  - maintain dermabond prineo and supportive bra.  -Ppx: SQH  -Rest of care per primary team    Kb Calles PGY2  Plastic Surgery  [id number]# [facility] pager  [phone] [facility] pager  Available on Teams  A: Mack is a 61yo F who is now s/p thymic mass resection by CT surgery and bilateral pec flap advancement by PRS on 1/3/23. Patient is recovering well.     Plan:   -Monitor I&Os, ARCHIE output, can keep ARCHIE self suction  -No abduction above shoulder  - maintain dermabond prineo and supportive bra.  -Ppx: SQH  -Rest of care per primary team    Kb Calles PGY2  Plastic Surgery  [id number]# [facility] pager  [phone] [facility] pager  Available on Teams

## 2023-01-09 NOTE — PROGRESS NOTE ADULT - ASSESSMENT
61 y/o female POD #6 s/p thymic mass resection by CT surgery and bilateral pec flap advancement by PRS. She is progressing well as expected. Tolerating PO with no N/V, appropriate UOP, BM x 1 overnight, pain controlled with medication, ambulating. Mild, intermittent tachycardia to low 100's, otherwise AVSS. WBC WNL, H/H stable.    Plans per primary team, Plastic Surgery recommends:  -Continue ARCHIE drain care per protocol, bulb suction  -No abduction above shoulder  -Pain control PRN  -DVT/GI prophylaxis  -OOB and ambulate  -IS  -Continue prineo dressing and support bra  -Remainder of plans per primary team  -Patient d/w Dr. Roger

## 2023-01-10 ENCOUNTER — TRANSCRIPTION ENCOUNTER (OUTPATIENT)
Age: 63
End: 2023-01-10

## 2023-01-10 VITALS
SYSTOLIC BLOOD PRESSURE: 107 MMHG | TEMPERATURE: 98 F | RESPIRATION RATE: 18 BRPM | HEART RATE: 96 BPM | OXYGEN SATURATION: 96 % | DIASTOLIC BLOOD PRESSURE: 66 MMHG

## 2023-01-10 LAB
ANION GAP SERPL CALC-SCNC: 12 MMOL/L — SIGNIFICANT CHANGE UP (ref 7–14)
BUN SERPL-MCNC: 18 MG/DL — SIGNIFICANT CHANGE UP (ref 7–23)
CALCIUM SERPL-MCNC: 9.2 MG/DL — SIGNIFICANT CHANGE UP (ref 8.4–10.5)
CHLORIDE SERPL-SCNC: 99 MMOL/L — SIGNIFICANT CHANGE UP (ref 98–107)
CO2 SERPL-SCNC: 24 MMOL/L — SIGNIFICANT CHANGE UP (ref 22–31)
CREAT SERPL-MCNC: 0.69 MG/DL — SIGNIFICANT CHANGE UP (ref 0.5–1.3)
EGFR: 97 ML/MIN/1.73M2 — SIGNIFICANT CHANGE UP
GLUCOSE SERPL-MCNC: 102 MG/DL — HIGH (ref 70–99)
HCT VFR BLD CALC: 34.6 % — SIGNIFICANT CHANGE UP (ref 34.5–45)
HGB BLD-MCNC: 11.3 G/DL — LOW (ref 11.5–15.5)
MAGNESIUM SERPL-MCNC: 2.3 MG/DL — SIGNIFICANT CHANGE UP (ref 1.6–2.6)
MCHC RBC-ENTMCNC: 32.2 PG — SIGNIFICANT CHANGE UP (ref 27–34)
MCHC RBC-ENTMCNC: 32.7 GM/DL — SIGNIFICANT CHANGE UP (ref 32–36)
MCV RBC AUTO: 98.6 FL — SIGNIFICANT CHANGE UP (ref 80–100)
NRBC # BLD: 0 /100 WBCS — SIGNIFICANT CHANGE UP (ref 0–0)
NRBC # FLD: 0 K/UL — SIGNIFICANT CHANGE UP (ref 0–0)
PHOSPHATE SERPL-MCNC: 4.3 MG/DL — SIGNIFICANT CHANGE UP (ref 2.5–4.5)
PLATELET # BLD AUTO: 275 K/UL — SIGNIFICANT CHANGE UP (ref 150–400)
POTASSIUM SERPL-MCNC: 4.6 MMOL/L — SIGNIFICANT CHANGE UP (ref 3.5–5.3)
POTASSIUM SERPL-SCNC: 4.6 MMOL/L — SIGNIFICANT CHANGE UP (ref 3.5–5.3)
RBC # BLD: 3.51 M/UL — LOW (ref 3.8–5.2)
RBC # FLD: 11.4 % — SIGNIFICANT CHANGE UP (ref 10.3–14.5)
SODIUM SERPL-SCNC: 135 MMOL/L — SIGNIFICANT CHANGE UP (ref 135–145)
WBC # BLD: 4.39 K/UL — SIGNIFICANT CHANGE UP (ref 3.8–10.5)
WBC # FLD AUTO: 4.39 K/UL — SIGNIFICANT CHANGE UP (ref 3.8–10.5)

## 2023-01-10 PROCEDURE — 71045 X-RAY EXAM CHEST 1 VIEW: CPT | Mod: 26

## 2023-01-10 RX ORDER — POLYETHYLENE GLYCOL 3350 17 G/17G
17 POWDER, FOR SOLUTION ORAL
Qty: 0 | Refills: 0 | DISCHARGE
Start: 2023-01-10

## 2023-01-10 RX ORDER — SENNA PLUS 8.6 MG/1
2 TABLET ORAL
Qty: 0 | Refills: 0 | DISCHARGE
Start: 2023-01-10

## 2023-01-10 RX ORDER — IBUPROFEN 200 MG
2 TABLET ORAL
Qty: 0 | Refills: 0 | DISCHARGE

## 2023-01-10 RX ORDER — ACETAMINOPHEN 500 MG
0 TABLET ORAL
Qty: 0 | Refills: 0 | DISCHARGE

## 2023-01-10 RX ORDER — OXYCODONE HYDROCHLORIDE 5 MG/1
1 TABLET ORAL
Qty: 30 | Refills: 0
Start: 2023-01-10 | End: 2023-01-14

## 2023-01-10 RX ORDER — ACETAMINOPHEN 500 MG
2 TABLET ORAL
Qty: 0 | Refills: 0 | DISCHARGE

## 2023-01-10 RX ADMIN — LEVALBUTEROL 0.63 MILLIGRAM(S): 1.25 SOLUTION, CONCENTRATE RESPIRATORY (INHALATION) at 04:48

## 2023-01-10 RX ADMIN — LIDOCAINE 1 PATCH: 4 CREAM TOPICAL at 12:22

## 2023-01-10 RX ADMIN — HEPARIN SODIUM 5000 UNIT(S): 5000 INJECTION INTRAVENOUS; SUBCUTANEOUS at 05:45

## 2023-01-10 RX ADMIN — SODIUM CHLORIDE 4 MILLILITER(S): 9 INJECTION INTRAMUSCULAR; INTRAVENOUS; SUBCUTANEOUS at 09:17

## 2023-01-10 RX ADMIN — LEVALBUTEROL 0.63 MILLIGRAM(S): 1.25 SOLUTION, CONCENTRATE RESPIRATORY (INHALATION) at 09:16

## 2023-01-10 RX ADMIN — Medication 15 MILLIGRAM(S): at 06:20

## 2023-01-10 RX ADMIN — Medication 15 MILLIGRAM(S): at 05:45

## 2023-01-10 RX ADMIN — Medication 15 MILLIGRAM(S): at 00:04

## 2023-01-10 RX ADMIN — Medication 15 MILLIGRAM(S): at 12:15

## 2023-01-10 RX ADMIN — FAMOTIDINE 20 MILLIGRAM(S): 10 INJECTION INTRAVENOUS at 05:45

## 2023-01-10 RX ADMIN — Medication 15 MILLIGRAM(S): at 13:15

## 2023-01-10 NOTE — PROGRESS NOTE ADULT - ASSESSMENT
A: Mack is a 61yo F who is now s/p thymic mass resection by CT surgery and bilateral pec flap advancement by PRS on 1/3/23. Patient is recovering well.     Plan:   -JPs removed  -No abduction above shoulder  - maintain dermabond prineo and supportive bra.  -Ppx: SQH  -Rest of care per primary team    Andrea Lopezsh  Plastic Surgery  #[id number] [facility] pager  [phone] [facility] pager  Available on teams

## 2023-01-10 NOTE — DISCHARGE NOTE PROVIDER - NSDCFUADDAPPT_GEN_ALL_CORE_FT
Call for appointments with plastic surgery and thoracic surgery See Dr. Mcqueen in 1 week. Call to make an apt. [phone]. Have a chest xray done prior to that apt   See Dr. Roger (Plastic surgeon) in 1 week as well. Please call to make your apt.

## 2023-01-10 NOTE — PROGRESS NOTE ADULT - PROVIDER SPECIALTY LIST ADULT
Critical Care
Pain Medicine
Plastic Surgery
Anesthesia
Critical Care
Pain Medicine
Plastic Surgery
Plastic Surgery
Critical Care
Plastic Surgery

## 2023-01-10 NOTE — DISCHARGE NOTE NURSING/CASE MANAGEMENT/SOCIAL WORK - NSDCPEFALRISK_GEN_ALL_CORE
For information on Fall & Injury Prevention, visit: [email or web address] OR  [email or web address] OR  https://www.cdc.gov/steadi/patient.html

## 2023-01-10 NOTE — DISCHARGE NOTE PROVIDER - CARE PROVIDER_API CALL
Chandler Mcqueen)  Surgery; Thoracic Surgery  [address] [place], Oncology Building [address] [place] [address]  Phone: [phone]  Fax: [phone]  Established Patient  Follow Up Time: 2 weeks    Benton Solorio)  Plastic Surgery  [address] [place] [address] [address]  [place] [address]  Phone: [phone]  Fax: [phone]  Established Patient  Follow Up Time: 1 week   Chandler Mcqueen)  Surgery; Thoracic Surgery  [address] [place], Oncology Building [address] [place] [address]  Phone: [phone]  Fax: [phone]  Established Patient  Follow Up Time: Jj Mack)  Plastic Surgery  [address] [facility], [facility] [address]  [place] [address]  Phone: [phone]  Fax: [phone]  Follow Up Time:

## 2023-01-10 NOTE — DISCHARGE NOTE NURSING/CASE MANAGEMENT/SOCIAL WORK - NSSCNAMETXT_GEN_ALL_CORE
[facility] [phone] initial visit will be day after discharge home. A nurse will call prior to the home visit.

## 2023-01-10 NOTE — DISCHARGE NOTE PROVIDER - NSDCCPCAREPLAN_GEN_ALL_CORE_FT
PRINCIPAL DISCHARGE DIAGNOSIS  Diagnosis: Chest wall mass  Assessment and Plan of Treatment: involving sternum; h/o thymic cancer

## 2023-01-10 NOTE — DISCHARGE NOTE NURSING/CASE MANAGEMENT/SOCIAL WORK - PATIENT PORTAL LINK FT
You can access the FollowMyHealth Patient Portal offered by [facility] by registering at the following website: [email or web address]. By joining [employer]’s FollowMyHealth portal, you will also be able to view your health information using other applications (apps) compatible with our system.

## 2023-01-10 NOTE — DISCHARGE NOTE PROVIDER - NSDCMRMEDTOKEN_GEN_ALL_CORE_FT
Tylenol: pt unsure dosage ; 2 tabs prn    Dulcolax Laxative 10 mg rectal suppository: 1 suppository(ies) rectal once a day, As Needed  ibuprofen 200 mg oral tablet: 2 tab(s) orally every 6 hours, As Needed  Alternate with Tylenol  oxyCODONE 5 mg oral tablet: 1 tab(s) orally every 4 hours, As Needed -Moderate-Severe Pain (4 - 10) MDD:6  polyethylene glycol 3350 oral powder for reconstitution: 17 gram(s) orally once a day  senna leaf extract oral tablet: 2 tab(s) orally once a day (at bedtime)  Tylenol 325 mg oral tablet: 2 tab(s) orally every 6 hours, As Needed. Alternate with Advil and continue to take with Oxy  as needed

## 2023-01-10 NOTE — DISCHARGE NOTE PROVIDER - HOSPITAL COURSE
Pt with h/o Thymic cancer treated with chemo & RT which were completed 9/2022 underwent a FB, sternectomy, en bloc anterior mediastinal mass resection with 2-4 anterior ribs and chest wall on 1/3/23.  Plastic surgery performed a muscle flap closure.  A Luan drain was removed on 1/6 but a post-op air leak delayed the chest tube removal until 1/8.  Drains were managed by plastic surgery and a drain was left for removal upon follow up after discharge Pt with h/o Thymic cancer treated with chemo & RT which were completed 9/2022 underwent a FB, sternectomy, en bloc anterior mediastinal mass resection with 2-4 anterior ribs and chest wall on 1/3/23.  Plastic surgery performed a muscle flap closure.  A Luan drain was removed on 1/6 but a post-op air leak delayed the chest tube removal until 1/8.  Drains were managed by plastic surgery. As of 1/10- All ARCHIE drains were out. Plastic  Surgery team aware. Pt c/o pain but managed with pain meds. Wearing soft support bra. Sternal wound c/d/i and stable. Pt amb ad ximena. Doing well. NO CP or SOB. Cleared for dc to home  by Dr. Mcqueen. VNS/home care arranged prior to discharge.   Vital Signs Last 24 Hrs  T(C): 36.7 (10 Walter 2023 09:00), Max: 37.1 (09 Jan 2023 22:16)  T(F): 98.1 (10 Walter 2023 09:00), Max: 98.8 (09 Jan 2023 22:16)  HR: 94 (10 Walter 2023 09:16) (87 - 99)  BP: 102/67 (10 Walter 2023 09:00) (101/69 - 116/72)  BP(mean): 80 (09 Jan 2023 19:00) (79 - 90)  RR: 18 (10 Walter 2023 09:00) (17 - 25)  SpO2: 96% (10 Walter 2023 09:16) (94% - 99%)    Parameters below as of 10 Walter 2023 09:16  Patient On (Oxygen Delivery Method): room air

## 2023-01-10 NOTE — PROGRESS NOTE ADULT - SUBJECTIVE AND OBJECTIVE BOX
CHIEF COMPLAINT: FOLLOW UP IN ICU FOR POSTOPERATIVE CARE OF PATIENT WHO IS S/P MEDIASTINAL MASS EXCISION      PROCEDURES: En bloc thymectomy with partial sternectomy and resection of anterior right ribs 2-4 and wedge resection of right middle lobe. Reconstruction of chest wall with MMA patch. 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 Extubated in OR. Transferred to CTICU.      HISTORY:   Patient reports moderate pain at chest wall incision sites which is worse with coughing and deep breathing without associated fever or dyspnea. Pain is improved with use of pain meds.     PHYSICAL EXAM:   Gen: Comfortable, No acute distress  Eyes: Sclera white, Conjunctiva normal, Eyelids normal, Pupils symmetrical   ENT: Mucous membranes moist,  ,  ,    Neck: Trachea midline,  ,  ,  ,  ,  ,    CV: Rate regular, Rhythm regular,  ,  ,    Resp: Breath sounds clear, No accessory muscles use, Four chest tubes,  ,    Abd: Soft, Non-distended, Non-tender, Bowel sounds normal,  ,  ,    Skin: Warm, No peripheral edema of lower extremities,  ,    : [place] in place  Neuro: Moving all 4 extremities,    Psych: A&Ox3      ASSESSMENT AND PLAN:     NEURO:  Post-operative Pain - Stable. Pain control with PCEA and Tylenol IV PRN.        RESPIRATORY:  Hypoxia - Wean nasal cannula for goal O2sat above 92. Obtain CXR. Incentive spirometry. Chest PT and frequent suctioning. Continue bronchodilators. OOB to chair & ambulate w/ assistance. Continuous pulse oximetry for support & to prevent decompensation.       Chest tube – Pleurevac regulated suctioning. Monitor chest tube output.    CARDIOVASCULAR:  Hemodynamically stable - Not on pressors. Continue hemodynamic monitoring.  Telemetry (medical test) - Reviewed by me today independently. Normal sinus rhythm.        RENAL:  Stable - Monitor IOs and electrolytes. Keep K above 4.0 and Mg above 2.0.      GASTROINTESTINAL:  GI prophylaxis not indicated  Zofran and Reglan IV PRN for nausea  Regular consistency diet         HEMATOLOGIC:  No signs of active bleeding. Monitor Hgb in CBC in AM  DVT prophylaxis with heparin subQ and SCDs.       INFECTIOUS DISEASE:  No signs of active infection. Will monitor for fever and leukocytosis.         ENDOCRINE:  Stable – Monitor glucose fingersticks for goal 120-180.          ONCOLOGY:  Thymic cancer s/p chemo and RT (9/2022) - Improved. S/P resection. Follow up final pathology.           Pertinent clinical, laboratory, radiographic, hemodynamic, echocardiographic, respiratory data, microbiologic data and chart were reviewed by myself and analyzed frequently throughout the course of the day and night by myself.    Plan discussed at length with the CTICU staff and Attending CT Surgeon -   Dr Chandler Mcqueen.      Patient's status was discussed with patient at bedside.     	      ________________________________________________      _________________________  VITAL SIGNS:  Vital Signs Last 24 Hrs  T(C): 36.2 (03 Jan 2023 18:15), Max: 36.8 (03 Jan 2023 11:43)  T(F): 97.2 (03 Jan 2023 18:15), Max: 98.2 (03 Jan 2023 11:43)  HR: 74 (03 Jan 2023 18:45) (71 - 86)  BP: 129/76 (03 Jan 2023 11:43) (129/76 - 129/76)  BP(mean): --  RR: 23 (03 Jan 2023 18:45) (16 - 24)  SpO2: 100% (03 Jan 2023 18:45) (98% - 100%)    Parameters below as of 03 Jan 2023 18:45  Patient On (Oxygen Delivery Method): nasal cannula w/ humidification  O2 Flow (L/min): 3    I/Os:   I&O's Detail    03 Jan 2023 07:01  -  03 Jan 2023 19:08  --------------------------------------------------------  IN:    Lactated Ringers: 60 mL  Total IN: 60 mL    OUT:    Chest Tube (mL): 70 mL    Chest Tube (mL): 250 mL    Indwelling Catheter - Urethral (mL): 240 mL  Total OUT: 560 mL    Total NET: -500 mL              MEDICATIONS:  MEDICATIONS  (STANDING):  acetaminophen   IVPB .. 750 milliGRAM(s) IV Intermittent once  acetaminophen   IVPB .. 750 milliGRAM(s) IV Intermittent every 6 hours  famotidine    Tablet 20 milliGRAM(s) Oral every 12 hours  heparin   Injectable 5000 Unit(s) SubCutaneous every 12 hours  hydromorphone (10 MICROgram(s)/mL) + bupivacaine 0.0625% in 0.9% Sodium Chloride PCEA 250 milliLiter(s) Epidural PCA Continuous  lactated ringers. 1000 milliLiter(s) (30 mL/Hr) IV Continuous <Continuous>  lidocaine   4% Patch 1 Patch Transdermal daily  polyethylene glycol 3350 17 Gram(s) Oral daily  senna 2 Tablet(s) Oral at bedtime    MEDICATIONS  (PRN):  hydromorphone (10 MICROgram(s)/mL) + bupivacaine 0.0625% in 0.9% Sodium Chloride PCEA Rescue Clinician  Bolus 5 milliLiter(s) Epidural every 15 minutes PRN for Pain Score greater than 6  ketorolac   Injectable 15 milliGRAM(s) IV Push every 6 hours PRN Moderate Pain (4 - 6)  naloxone Injectable 0.1 milliGRAM(s) IV Push every 3 minutes PRN For ANY of the following changes in patient status:  A. RR LESS THAN 10 breaths per minute, B. Oxygen saturation LESS THAN 90%, C. Sedation score of 6  ondansetron Injectable 4 milliGRAM(s) IV Push every 6 hours PRN Nausea      LABS:  Pre-op Laboratory data was independently reviewed by me today.     12/19/22 - Hgb 12.4, Cr 0.71      RADIOLOGY:   Radiology images were independently reviewed by me today. Reports were reviewed by me today.    Post op CXR 1/3/23 - Chest tubes in place. Lungs clear. No pneumothorax.
ALONSO HUITRON                     [id number]    HPI:  Pt is a 62 y.o. female ;pt speaks Mandarin ; requested daughter  in law Hill Huitron to translate.  Pt reports h/o Thymic  cancer ; tx with chemo / RT completed 9/22.  Pt presents for preop eval to have resection of sternal anterior mediastinal mass on 1/3/22 (19 Dec 2022 11:10)    CHIEF COMPLAINT: FOLLOW UP IN ICU FOR POSTOPERATIVE CARE OF PATIENT WHO IS S/P MEDIASTINAL MASS EXCISION      PROCEDURES: En bloc thymectomy with partial sternectomy and resection of anterior right ribs 2-4 and wedge resection of right middle lobe. Reconstruction of chest wall with MMA patch. 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)  Post op pain  Chest tube in place    PAST MEDICAL & SURGICAL HISTORY:  Mediastinal mass      Thymic cancer      S/P appendectomy  2003      History of lung biopsy                VITAL SIGNS:  Vital Signs Last 24 Hrs  T(C): 36.7 (07 Jan 2023 08:00), Max: 37 (06 Jan 2023 12:00)  T(F): 98.1 (07 Jan 2023 08:00), Max: 98.6 (06 Jan 2023 12:00)  HR: 88 (07 Jan 2023 10:00) (76 - 97)  BP: 134/90 (07 Jan 2023 11:00) (92/59 - 134/90)  BP(mean): 104 (07 Jan 2023 11:00) (70 - 104)  RR: 22 (07 Jan 2023 11:00) (16 - 30)  SpO2: 98% (07 Jan 2023 11:00) (93% - 100%)    Parameters below as of 07 Jan 2023 11:00  Patient On (Oxygen Delivery Method): room air        I/Os:   I&O's Detail    06 Jan 2023 07:01  -  07 Jan 2023 07:00  --------------------------------------------------------  IN:    Oral Fluid: 480 mL  Total IN: 480 mL    OUT:    Chest Tube (mL): 0 mL    Chest Tube (mL): 140 mL    Drain (mL): 35 mL    Voided (mL): 1750 mL  Total OUT: 1925 mL    Total NET: -1445 mL      07 Jan 2023 07:01  -  07 Jan 2023 11:50  --------------------------------------------------------  IN:  Total IN: 0 mL    OUT:    Chest Tube (mL): 70 mL    Voided (mL): 200 mL  Total OUT: 270 mL    Total NET: -270 mL          CAPILLARY BLOOD GLUCOSE          =======================MEDICATIONS===================  MEDICATIONS  (STANDING):  acetaminophen   IVPB .. 750 milliGRAM(s) IV Intermittent once  acetaminophen   IVPB .. 750 milliGRAM(s) IV Intermittent every 6 hours  albuterol    0.083% 2.5 milliGRAM(s) Nebulizer every 8 hours  famotidine    Tablet 20 milliGRAM(s) Oral every 12 hours  heparin   Injectable 5000 Unit(s) SubCutaneous every 12 hours  influenza   Vaccine 0.5 milliLiter(s) IntraMuscular once  ketorolac   Injectable 15 milliGRAM(s) IV Push every 6 hours  lidocaine   4% Patch 1 Patch Transdermal daily  polyethylene glycol 3350 17 Gram(s) Oral daily  senna 2 Tablet(s) Oral at bedtime  sodium chloride 3%  Inhalation 4 milliLiter(s) Inhalation every 8 hours    MEDICATIONS  (PRN):  HYDROmorphone  Injectable 0.3 milliGRAM(s) IV Push every 3 hours PRN Severe Breakthrough Pain (7 - 10)  metoclopramide Injectable 10 milliGRAM(s) IV Push every 8 hours PRN nausea and/or vomitting  naloxone Injectable 0.1 milliGRAM(s) IV Push every 3 minutes PRN For ANY of the following changes in patient status:  A. RR LESS THAN 10 breaths per minute, B. Oxygen saturation LESS THAN 90%, C. Sedation score of 6  ondansetron Injectable 4 milliGRAM(s) IV Push every 6 hours PRN Nausea  oxyCODONE    IR 5 milliGRAM(s) Oral every 3 hours PRN Moderate-Severe Pain (4 - 10)      PHYSICAL EXAM============================  General:                         Awake, alert, not in any distress  Neuro:                            Moving all extremities to commands.   Respiratory:	Air entry fair and  bilateral conducted sounds                                           Effort even and unlabored.  CV:		Regular rate and rhythm. Normal S1/S2                                          Distal pulses present.  Abdomen:	                     Soft, non-distended. Bowel sounds present   Skin:		No rash.  Extremities:	Warm, no cyanosis or edema.  Palpable pulses    ============================LABS=========================                        10.8   4.87  )-----------( 189      ( 07 Jan 2023 03:20 )             33.6     01-07    136  |  101  |  13  ----------------------------<  107<H>  5.2   |  22  |  0.48<L>    Ca    8.8      07 Jan 2023 03:20  Phos  4.1     01-07  Mg     2.10     01-07        PT/INR - ( 07 Jan 2023 03:20 )   PT: 11.5 sec;   INR: 0.99 ratio         PTT - ( 07 Jan 2023 03:20 )  PTT:27.4 sec      ASSESSMENT AND PLAN:     NEURO:  Post-operative Pain - Stable. Pain control with PCEA and Tylenol IV PRN.    D/C PCEA today    RESPIRATORY:  Stable on room air - Incentive spirometry. Chest PT and suctioning of secretions. Out of bed to chair and ambulate with assistance. Continuous pulse oximetry for support & to prevent decompensation.       Chest tube – Pleurevac regulated suctioning. Monitor chest tube output. Discontinue vanessa drain. Continue plastic surgery drains.     CARDIOVASCULAR:  Hemodynamically stable - Not on pressors. Continue hemodynamic monitoring.  Telemetry (medical test) - Reviewed by me today independently. Normal sinus rhythm.        RENAL:  Stable - Monitor IOs and electrolytes. Keep K above 4.0 and Mg above 2.0.      GASTROINTESTINAL:  GI prophylaxis not indicated  Zofran and Reglan IV PRN for nausea  Regular consistency diet         HEMATOLOGIC:  No signs of active bleeding. Monitor Hgb in CBC in AM  DVT prophylaxis with heparin subQ and SCDs.       INFECTIOUS DISEASE:  No signs of active infection. Will monitor for fever and leukocytosis.         ENDOCRINE:  Stable – Monitor glucose fingersticks for goal 120-180.          ONCOLOGY:  Thymic cancer s/p chemo and RT (9/2022) - Improved. S/P resection. Follow up final pathology.           Pertinent clinical, laboratory, radiographic, hemodynamic, echocardiographic, respiratory data, microbiologic data and chart were reviewed by myself and analyzed frequently throughout the course of the day and night by myself.    Plan discussed at length with the CTICU staff and Attending CT Surgeon -   Dr Amairani Cummings      Patient's status was discussed with patient at bedside.     	    Patito Cheek DO FACEP    
ALONSO HUITRON                     [id number]    HPI:  Pt is a 62 y.o. female ;pt speaks Mandarin ; requested daughter  in law Hill Huitron to translate.  Pt reports h/o Thymic  cancer ; tx with chemo / RT completed 9/22.  Pt presents for preop eval to have resection of sternal anterior mediastinal mass on 1/3/22 (19 Dec 2022 11:10)    CHIEF COMPLAINT: Follow up in ICU  for postoperative care of patient who is s/p En bloc thymectomy with partial sternectomy and resection of anterior right ribs 2-4 and wedge resection of right middle lobe.    Procedure: En bloc thymectomy with partial sternectomy and resection of anterior right ribs 2-4 and wedge resection of right middle lobe. Reconstruction of chest wall with MMA patch. 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)  Post op pain  Chest tube in place  Postop Nausea        PAST MEDICAL & SURGICAL HISTORY:  Mediastinal mass      Thymic cancer      S/P appendectomy  2003      History of lung biopsy                VITAL SIGNS:  Vital Signs Last 24 Hrs  T(C): 36.9 (05 Jan 2023 12:00), Max: 37.4 (05 Jan 2023 08:00)  T(F): 98.5 (05 Jan 2023 12:00), Max: 99.4 (05 Jan 2023 08:00)  HR: 80 (05 Jan 2023 13:00) (76 - 104)  BP: 100/62 (05 Jan 2023 13:00) (85/63 - 142/70)  BP(mean): 73 (05 Jan 2023 13:00) (49 - 94)  RR: 19 (05 Jan 2023 13:00) (13 - 25)  SpO2: 99% (05 Jan 2023 13:00) (90% - 100%)    Parameters below as of 05 Jan 2023 13:00  Patient On (Oxygen Delivery Method): nasal cannula w/ humidification  O2 Flow (L/min): 2      I/Os:   I&O's Detail    04 Jan 2023 07:01  -  05 Jan 2023 07:00  --------------------------------------------------------  IN:    IV PiggyBack: 450 mL    Lactated Ringers: 720 mL  Total IN: 1170 mL    OUT:    Chest Tube (mL): 120 mL    Chest Tube (mL): 70 mL    Drain (mL): 300 mL    Indwelling Catheter - Urethral (mL): 1220 mL  Total OUT: 1710 mL    Total NET: -540 mL      05 Jan 2023 07:01  -  05 Jan 2023 13:35  --------------------------------------------------------  IN:    Lactated Ringers: 150 mL    Oral Fluid: 240 mL  Total IN: 390 mL    OUT:    Chest Tube (mL): 10 mL    Chest Tube (mL): 0 mL    Drain (mL): 0 mL    Indwelling Catheter - Urethral (mL): 210 mL  Total OUT: 220 mL    Total NET: 170 mL          CAPILLARY BLOOD GLUCOSE          =======================MEDICATIONS===================  MEDICATIONS  (STANDING):  acetaminophen   IVPB .. 750 milliGRAM(s) IV Intermittent once  albuterol    0.083% 2.5 milliGRAM(s) Nebulizer every 8 hours  famotidine    Tablet 20 milliGRAM(s) Oral every 12 hours  heparin   Injectable 5000 Unit(s) SubCutaneous every 12 hours  ketorolac   Injectable 15 milliGRAM(s) IV Push every 6 hours  lactated ringers. 1000 milliLiter(s) (30 mL/Hr) IV Continuous <Continuous>  lidocaine   4% Patch 1 Patch Transdermal daily  polyethylene glycol 3350 17 Gram(s) Oral daily  ropivacaine 0.2% in Sodium Chloride PCEA 200 milliLiter(s) Epidural PCA Continuous  senna 2 Tablet(s) Oral at bedtime  sodium chloride 3%  Inhalation 4 milliLiter(s) Inhalation every 8 hours    MEDICATIONS  (PRN):  HYDROmorphone  Injectable 0.3 milliGRAM(s) IV Push every 3 hours PRN Severe Breakthrough Pain (7 - 10)  metoclopramide Injectable 10 milliGRAM(s) IV Push every 8 hours PRN nausea and/or vomitting  naloxone Injectable 0.1 milliGRAM(s) IV Push every 3 minutes PRN For ANY of the following changes in patient status:  A. RR LESS THAN 10 breaths per minute, B. Oxygen saturation LESS THAN 90%, C. Sedation score of 6  ondansetron Injectable 4 milliGRAM(s) IV Push every 6 hours PRN Nausea  oxyCODONE    IR 5 milliGRAM(s) Oral every 3 hours PRN Moderate-Severe Pain (4 - 10)      PHYSICAL EXAM============================  General:                         Awake, alert, not in any distress  Neuro:                            Moving all extremities to commands.   Respiratory:	Air entry fair and  bilateral conducted sounds                                           Effort even and unlabored.  CV:		Regular rate and rhythm. Normal S1/S2                                          Distal pulses present.  Abdomen:	                     Soft, non-distended. Bowel sounds present   Skin:		No rash.  Extremities:	Warm, no cyanosis or edema.  Palpable pulses    ============================LABS=========================                        11.3   9.82  )-----------( 181      ( 05 Jan 2023 04:26 )             35.0     01-05    137  |  100  |  10  ----------------------------<  109<H>  4.4   |  29  |  0.65    Ca    8.6      05 Jan 2023 04:26  Phos  2.2     01-05  Mg     2.00     01-05    TPro  6.8  /  Alb  3.8  /  TBili  0.4  /  DBili  x   /  AST  28  /  ALT  13  /  AlkPhos  53  01-04    LIVER FUNCTIONS - ( 04 Jan 2023 03:14 )  Alb: 3.8 g/dL / Pro: 6.8 g/dL / ALK PHOS: 53 U/L / ALT: 13 U/L / AST: 28 U/L / GGT: x           PT/INR - ( 05 Jan 2023 04:26 )   PT: 12.9 sec;   INR: 1.11 ratio         PTT - ( 05 Jan 2023 04:26 )  PTT:32.6 sec  ABG - ( 04 Jan 2023 03:40 )  pH, Arterial: 7.31  pH, Blood: x     /  pCO2: 51    /  pO2: 183   / HCO3: 26    / Base Excess: -1.1  /  SaO2: 99.5        A/P:   62yFemale s/p En bloc thymectomy with partial sternectomy and resection of anterior right ribs 2-4 and wedge resection of right middle lobe. Reconstruction of chest wall with MMA patch. Bilateral pectoralis advancement flaps and closure by plastic surgery 03-Jan-2023 , experiencing  pain with deep breathing.                             Neuro:   Nonfocal                                        Pain control with PCEA /  Tylenol PRN, D/C PCA                            Cardiovascular:                                          Telemetry (medical test) - Reviewed by me today independently. Normal sinus rhythm.                                          Continue hemodynamic monitoring to prevent decompensation.                            Respiratory:                                         Postop hypoxemia requiring O2 via nasal cannula probably due to postop pain - Wean nasal cannula for goal O2sat above 92%.                                         . Encourage incentive spirometry.                                                   Chest PT and frequent suctioning. Continue bronchodilators, Pulmozyme and inhaled 3% saline inhalations.                                                      OOB to chair & ambulate w/ assistance.                                                           Continuous pulse oximetry for support & to prevent decompensation.                                         Monitor chest tube output                                         Chest tube to WS                                                                                         GI     Nausea: Continue Zofran / Reglan  - PRN                                         On puree diet, advance to regular diet as tolerated                                         Continue bowel regimen	                                                                 Renal:                                         Continue LR  30cc/hr                                         Monitor I/Os and electrolytes                                                                                        Hem/ Onc:                                         DVT prophylaxis with SQ Heparin and SCDs                                         Monitor chest tube output &  signs of bleeding.                                          Follow CBC in AM                           Infectious disease:                                            Monitor for fever / leukocytosis.                                          All surgical incision / chest tube  sites look clean                            Endocrine                                             Continue Accu-Checks with coverage                                                  Pertinent clinical, laboratory, radiographic, hemodynamic, echocardiographic, respiratory data, microbiologic data and chart were reviewed and analyzed frequently throughout the course of the day and night.     Patient seen, examined and plan discussed with CT Surgeon Dr. Mcqueen / CTICU team during rounds.    OOB to chair and ambulate with physical therapy as tolerated.     Status discussed with patient and updated plan of care.     I have spent 35 minutes with this patient including 20 minutes of time coordinating care in the ICU.        Patito WELSHP    
Anesthesia Pain Management Service    SUBJECTIVE: Pt doing well with PCEA without problems reported.    Therapy:	  [ ] IV PCA	   [ X] Epidural           [ ] s/p Spinal Opoid              [ ] Postpartum infusion	  [ ] Patient controlled regional anesthesia (PCRA)    [ ] prn Analgesics    Allergies    No Known Allergies    Intolerances      MEDICATIONS  (STANDING):  acetaminophen   IVPB .. 750 milliGRAM(s) IV Intermittent once  acetaminophen   IVPB .. 750 milliGRAM(s) IV Intermittent every 6 hours  albuterol    0.083% 2.5 milliGRAM(s) Nebulizer every 8 hours  famotidine    Tablet 20 milliGRAM(s) Oral every 12 hours  heparin   Injectable 5000 Unit(s) SubCutaneous every 12 hours  HYDROmorphone PCA (1 mG/mL) 30 milliLiter(s) PCA Continuous PCA Continuous  lactated ringers. 1000 milliLiter(s) (30 mL/Hr) IV Continuous <Continuous>  lidocaine   4% Patch 1 Patch Transdermal daily  polyethylene glycol 3350 17 Gram(s) Oral daily  ropivacaine 0.2% in Sodium Chloride PCEA 200 milliLiter(s) Epidural PCA Continuous  senna 2 Tablet(s) Oral at bedtime  sodium chloride 3%  Inhalation 4 milliLiter(s) Inhalation every 8 hours    MEDICATIONS  (PRN):  HYDROmorphone PCA (1 mG/mL) Rescue Clinician Bolus 0.25 milliGRAM(s) IV Push every 15 minutes PRN for Pain Scale GREATER THAN 6  ketorolac   Injectable 15 milliGRAM(s) IV Push every 6 hours PRN Moderate Pain (4 - 6)  metoclopramide Injectable 10 milliGRAM(s) IV Push every 8 hours PRN nausea and/or vomitting  naloxone Injectable 0.1 milliGRAM(s) IV Push every 3 minutes PRN For ANY of the following changes in patient status:  A. RR LESS THAN 10 breaths per minute, B. Oxygen saturation LESS THAN 90%, C. Sedation score of 6  ondansetron Injectable 4 milliGRAM(s) IV Push every 6 hours PRN Nausea      OBJECTIVE:   [X] No new signs     [ ] Other:    Side Effects:  [X ] None			[ ] Other:    Assessment of Catheter Site:		[ X] Intact		[ ] Other:    ASSESSMENT/PLAN  [ X] Continue current therapy    [ ] Therapy changed to:    [ ] IV PCA       [ ] Epidural     [ ] prn Analgesics     Comments: Continue current PCEA settings.
Anesthesia Pain Management Service- Attending Addendum    SUBJECTIVE: Pt doing well with PCEA without problems reported.    Therapy:	  [ ] IV PCA	   [ X] Epidural           [ ] s/p Spinal Opoid              [ ] Postpartum infusion	  [ ] Patient controlled regional anesthesia (PCRA)    [ ] prn Analgesics    Allergies    No Known Allergies    Intolerances      MEDICATIONS  (STANDING):  acetaminophen   IVPB .. 750 milliGRAM(s) IV Intermittent once  acetaminophen   IVPB .. 750 milliGRAM(s) IV Intermittent every 6 hours  albuterol    0.083% 2.5 milliGRAM(s) Nebulizer every 8 hours  famotidine    Tablet 20 milliGRAM(s) Oral every 12 hours  heparin   Injectable 5000 Unit(s) SubCutaneous every 12 hours  ketorolac   Injectable 15 milliGRAM(s) IV Push every 6 hours  lidocaine   4% Patch 1 Patch Transdermal daily  polyethylene glycol 3350 17 Gram(s) Oral daily  ropivacaine 0.2% in Sodium Chloride PCEA 200 milliLiter(s) Epidural PCA Continuous  senna 2 Tablet(s) Oral at bedtime  sodium chloride 3%  Inhalation 4 milliLiter(s) Inhalation every 8 hours    MEDICATIONS  (PRN):  metoclopramide Injectable 10 milliGRAM(s) IV Push every 8 hours PRN nausea and/or vomitting  naloxone Injectable 0.1 milliGRAM(s) IV Push every 3 minutes PRN For ANY of the following changes in patient status:  A. RR LESS THAN 10 breaths per minute, B. Oxygen saturation LESS THAN 90%, C. Sedation score of 6  ondansetron Injectable 4 milliGRAM(s) IV Push every 6 hours PRN Nausea  oxyCODONE    IR 5 milliGRAM(s) Oral every 3 hours PRN Moderate-Severe Pain (4 - 10)      OBJECTIVE:   [X] No new signs     [ ] Other:    Side Effects:  [X ] None			[ ] Other:    Assessment of Catheter Site:		[ X] Intact		[ ] Other:    ASSESSMENT/PLAN  [ X] Continue current therapy    [ ] Therapy changed to:    [ ] IV PCA       [ ] Epidural     [ ] prn Analgesics     Comments: Patient's pain is well controlled. Continue current PCEA settings.    Note written after patient seen
Anesthesia Pain Management Service: Day _3_ of Epidural    SUBJECTIVE: Patient doing well with PCEA and no problems. Patient reports improvement in pain from yesterday Patient denies headache, numbness and tingling.  ID# [id number] used. Patient seen walking in the hallway and back to the room.  Pain Scale Score: Refer to charted pain scores    THERAPY:  [x ] Epidural Bupivacaine 0.0625% and Hydromorphone  		[ X] 10 micrograms/mL	[ ] 5 micrograms/mL  [ ] Epidural Bupivacaine 0.0625% and Fentanyl - 2 micrograms/mL  [ ] Epidural Ropivacaine 0.1% plain – 1 mg/mL  [ ] Patient Controlled Regional Anesthesia (PCRA) Ropivacaine  		[ ] 0.2%			[ ] 0.1%    Demand dose __3_ lockout __15_ (minutes) Continuous Rate _4__ Total: __92.05__ ml used (in past 24 hours)      MEDICATIONS  (STANDING):  acetaminophen   IVPB .. 750 milliGRAM(s) IV Intermittent once  acetaminophen   IVPB .. 750 milliGRAM(s) IV Intermittent every 6 hours  albuterol    0.083% 2.5 milliGRAM(s) Nebulizer every 8 hours  famotidine    Tablet 20 milliGRAM(s) Oral every 12 hours  heparin   Injectable 5000 Unit(s) SubCutaneous every 12 hours  ketorolac   Injectable 15 milliGRAM(s) IV Push every 6 hours  lidocaine   4% Patch 1 Patch Transdermal daily  polyethylene glycol 3350 17 Gram(s) Oral daily  ropivacaine 0.2% in Sodium Chloride PCEA 200 milliLiter(s) Epidural PCA Continuous  senna 2 Tablet(s) Oral at bedtime  sodium chloride 3%  Inhalation 4 milliLiter(s) Inhalation every 8 hours    MEDICATIONS  (PRN):  metoclopramide Injectable 10 milliGRAM(s) IV Push every 8 hours PRN nausea and/or vomitting  naloxone Injectable 0.1 milliGRAM(s) IV Push every 3 minutes PRN For ANY of the following changes in patient status:  A. RR LESS THAN 10 breaths per minute, B. Oxygen saturation LESS THAN 90%, C. Sedation score of 6  ondansetron Injectable 4 milliGRAM(s) IV Push every 6 hours PRN Nausea  oxyCODONE    IR 5 milliGRAM(s) Oral every 3 hours PRN Moderate-Severe Pain (4 - 10)      OBJECTIVE: Patient sitting up in bed. CTX2 in place.    Assessment of Catheter Site:	[ ] Left	[ ] Right  [x ] Epidural 	[ ] Femoral	      [ ] Saphenous   [ ] Supraclavicular   [ ] Other:    [x ] Dressing intact	[x ] Site non-tender	[ x] Site without erythema, discharge, edema  [x ] Epidural tubing and connection checked	[x] Gross neurological exam within normal limits  [ ] Catheter removed – tip intact		[ ] Afebrile  	[ ] Febrile: ___   [ X] see Temp under VS below)    PT/INR - ( 06 Jan 2023 02:48 )   PT: 12.3 sec;   INR: 1.06 ratio         PTT - ( 06 Jan 2023 02:48 )  PTT:30.5 sec                      10.6   8.12  )-----------( 163      ( 06 Jan 2023 02:48 )             32.5     Vital Signs Last 24 Hrs  T(C): 36.4 (01-06-23 @ 08:00), Max: 37.1 (01-05-23 @ 16:00)  T(F): 97.6 (01-06-23 @ 08:00), Max: 98.8 (01-05-23 @ 16:00)  HR: 80 (01-06-23 @ 11:00) (78 - 95)  BP: 88/59 (01-06-23 @ 11:00) (84/56 - 118/79)  BP(mean): 69 (01-06-23 @ 11:00) (65 - 93)  RR: 21 (01-06-23 @ 11:00) (17 - 25)  SpO2: 100% (01-06-23 @ 11:00) (92% - 100%)      Sedation Score:	[x ] Alert	[ ] Drowsy	[ ] Arousable	[ ] Asleep	[ ] Unresponsive    Side Effects:	[x ] None	[ ] Nausea	[ ] Vomiting	[ ] Pruritus  		[ ] Weakness		[ ] Numbness	[ ] Other:    ASSESSMENT/ PLAN:    Therapy to  be:	[x ] Continue   [ ] Discontinued   [ ] Change to prn Analgesics    Documentation and Verification of current medications:  [ X ] Done	[ ] Not done, not eligible, reason:    Comments: Doing OK with epidural and may continue.     Progress Note written now but Patient was seen earlier.
Anesthesia Pain Management Service: PostOp Day _1_ of Epidural    SUBJECTIVE: Patient feeling nauseous and has vomited this morning after drinking some fluids but antiemetics are effective. Reports pain at site of surgery. Per primary RN, continuous was decreased to 2ml/hr due to nausea/vomiting. Patient denies headache, numbness and tingling.   Pain Scale Score:7-8/10   Refer to charted pain scores    THERAPY:  [x ] Epidural Bupivacaine 0.0625% and Hydromorphone  		[ X] 10 micrograms/mL	[ ] 5 micrograms/mL  [ ] Epidural Bupivacaine 0.0625% and Fentanyl - 2 micrograms/mL  [ ] Epidural Ropivacaine 0.1% plain – 1 mg/mL  [ ] Patient Controlled Regional Anesthesia (PCRA) Ropivacaine  		[ ] 0.2%			[ ] 0.1%    Demand dose __3_ lockout __15_ (minutes) Continuous Rate _2__ Total: __60.4__ ml used (in past 24 hours)      MEDICATIONS  (STANDING):  acetaminophen   IVPB .. 750 milliGRAM(s) IV Intermittent once  acetaminophen   IVPB .. 750 milliGRAM(s) IV Intermittent every 6 hours  albuterol    0.083% 2.5 milliGRAM(s) Nebulizer every 8 hours  famotidine    Tablet 20 milliGRAM(s) Oral every 12 hours  heparin   Injectable 5000 Unit(s) SubCutaneous every 12 hours  HYDROmorphone PCA (1 mG/mL) 30 milliLiter(s) PCA Continuous PCA Continuous  lactated ringers. 1000 milliLiter(s) (30 mL/Hr) IV Continuous <Continuous>  lidocaine   4% Patch 1 Patch Transdermal daily  polyethylene glycol 3350 17 Gram(s) Oral daily  ropivacaine 0.2% in Sodium Chloride PCEA 200 milliLiter(s) Epidural PCA Continuous  senna 2 Tablet(s) Oral at bedtime  sodium chloride 3%  Inhalation 4 milliLiter(s) Inhalation every 8 hours    MEDICATIONS  (PRN):  HYDROmorphone PCA (1 mG/mL) Rescue Clinician Bolus 0.25 milliGRAM(s) IV Push every 15 minutes PRN for Pain Scale GREATER THAN 6  ketorolac   Injectable 15 milliGRAM(s) IV Push every 6 hours PRN Moderate Pain (4 - 6)  metoclopramide Injectable 10 milliGRAM(s) IV Push every 8 hours PRN nausea and/or vomitting  naloxone Injectable 0.1 milliGRAM(s) IV Push every 3 minutes PRN For ANY of the following changes in patient status:  A. RR LESS THAN 10 breaths per minute, B. Oxygen saturation LESS THAN 90%, C. Sedation score of 6  ondansetron Injectable 4 milliGRAM(s) IV Push every 6 hours PRN Nausea      OBJECTIVE: Patient sitting up in chair, CTx2.    Assessment of Catheter Site:	[ ] Left	[ ] Right  [x ] Epidural 	[ ] Femoral	      [ ] Saphenous   [ ] Supraclavicular   [ ] Other:    [x ] Dressing intact	[x ] Site non-tender	[ x] Site without erythema, discharge, edema  [x ] Epidural tubing and connection checked	[x] Gross neurological exam within normal limits  [ ] Catheter removed – tip intact		[ ] Afebrile  	[ ] Febrile: ___   [ X] see Temp under VS below)    PT/INR - ( 04 Jan 2023 03:14 )   PT: 12.0 sec;   INR: 1.03 ratio         PTT - ( 04 Jan 2023 03:14 )  PTT:30.2 sec                      11.8   10.55 )-----------( 190      ( 04 Jan 2023 03:14 )             36.4     Vital Signs Last 24 Hrs  T(C): 37.2 (01-04-23 @ 08:00), Max: 37.2 (01-04-23 @ 08:00)  T(F): 99 (01-04-23 @ 08:00), Max: 99 (01-04-23 @ 08:00)  HR: 83 (01-04-23 @ 10:00) (71 - 101)  BP: 100/54 (01-04-23 @ 07:00) (100/54 - 129/76)  BP(mean): 65 (01-04-23 @ 07:00) (65 - 87)  RR: 17 (01-04-23 @ 10:00) (11 - 24)  SpO2: 100% (01-04-23 @ 10:00) (93% - 100%)      Sedation Score:	[x ] Alert	[ ] Drowsy	[ ] Arousable	[ ] Asleep	[ ] Unresponsive    Side Effects:	[x ] None	[ ] Nausea	[ ] Vomiting	[ ] Pruritus  		[ ] Weakness		[ ] Numbness	[ ] Other:    ASSESSMENT/ PLAN:    Therapy to  be:	[x ] Continue   [ ] Discontinued   [ ] Change to prn Analgesics    Documentation and Verification of current medications:  [ X ] Done	[ ] Not done, not eligible, reason:    Comments: Levels checked using ice pack, with coverage R side > L side from T1-T8. Discussed patient with primary team, ropivacaine PCEA and IV Dilaudid PCA ordered.    Progress Note written now but Patient was seen earlier.
POST ANESTHESIA EVALUATION    62y Female POSTOP DAY 1 S/P     MENTAL STATUS: Patient participation [ x ] Awake     [  ] Arousable     [  ] Sedated    AIRWAY PATENCY: [x  ] Satisfactory  [  ] Other:     Vital Signs Last 24 Hrs  T(C): 37.2 (04 Jan 2023 08:00), Max: 37.2 (04 Jan 2023 08:00)  T(F): 99 (04 Jan 2023 08:00), Max: 99 (04 Jan 2023 08:00)  HR: 80 (04 Jan 2023 11:00) (71 - 101)  BP: 100/54 (04 Jan 2023 07:00) (100/54 - 129/76)  BP(mean): 65 (04 Jan 2023 07:00) (65 - 87)  RR: 16 (04 Jan 2023 11:00) (11 - 24)  SpO2: 98% (04 Jan 2023 11:00) (93% - 100%)    Parameters below as of 04 Jan 2023 11:00  Patient On (Oxygen Delivery Method): nasal cannula w/ humidification  O2 Flow (L/min): 1    I&O's Summary    03 Jan 2023 07:01  -  04 Jan 2023 07:00  --------------------------------------------------------  IN: 535 mL / OUT: 1780 mL / NET: -1245 mL    04 Jan 2023 07:01  -  04 Jan 2023 11:14  --------------------------------------------------------  IN: 400 mL / OUT: 170 mL / NET: 230 mL          NAUSEA/ VOMITTING:  [ x ] NONE  [  ] CONTROLLED [  ] OTHER     PAIN: [ x ] CONTROLLED WITH CURRENT REGIMEN  [  ] OTHER    [x  ] NO APPARENT ANESTHESIA COMPLICATIONS      Comments: 
Plastic Surgery Daily Progress Note  =====================================================    SUBJECTIVE: Patient seen and examined at bedside on AM rounds.     PMH:  s/p thymic mass resection by CT surgery and bilateral pec flap advancement by PRS     ALLERGIES:  No Known Allergies      --------------------------------------------------------------------------------------    MEDICATIONS:    Neurologic Medications  acetaminophen   IVPB .. 750 milliGRAM(s) IV Intermittent once  acetaminophen   IVPB .. 750 milliGRAM(s) IV Intermittent every 6 hours  hydromorphone (10 MICROgram(s)/mL) + bupivacaine 0.0625% in 0.9% Sodium Chloride PCEA 250 milliLiter(s) Epidural PCA Continuous  hydromorphone (10 MICROgram(s)/mL) + bupivacaine 0.0625% in 0.9% Sodium Chloride PCEA Rescue Clinician  Bolus 5 milliLiter(s) Epidural every 15 minutes PRN for Pain Score greater than 6  ketorolac   Injectable 15 milliGRAM(s) IV Push every 6 hours PRN Moderate Pain (4 - 6)  ondansetron Injectable 4 milliGRAM(s) IV Push every 6 hours PRN Nausea    Respiratory Medications  albuterol    0.083% 2.5 milliGRAM(s) Nebulizer every 8 hours  sodium chloride 3%  Inhalation 4 milliLiter(s) Inhalation every 8 hours    Cardiovascular Medications    Gastrointestinal Medications  albumin human  5% IVPB 250 milliLiter(s) IV Intermittent once  famotidine    Tablet 20 milliGRAM(s) Oral every 12 hours  lactated ringers. 1000 milliLiter(s) IV Continuous <Continuous>  polyethylene glycol 3350 17 Gram(s) Oral daily  senna 2 Tablet(s) Oral at bedtime    Genitourinary Medications    Hematologic/Oncologic Medications  heparin   Injectable 5000 Unit(s) SubCutaneous every 12 hours    Antimicrobial/Immunologic Medications    Endocrine/Metabolic Medications    Topical/Other Medications  lidocaine   4% Patch 1 Patch Transdermal daily  naloxone Injectable 0.1 milliGRAM(s) IV Push every 3 minutes PRN For ANY of the following changes in patient status:  A. RR LESS THAN 10 breaths per minute, B. Oxygen saturation LESS THAN 90%, C. Sedation score of 6    --------------------------------------------------------------------------------------    VITAL SIGNS:  ICU Vital Signs Last 24 Hrs  T(C): 37 (05 Jan 2023 04:00), Max: 37.2 (04 Jan 2023 08:00)  T(F): 98.6 (05 Jan 2023 04:00), Max: 99 (04 Jan 2023 08:00)  HR: 90 (05 Jan 2023 07:00) (76 - 104)  BP: 114/56 (05 Jan 2023 06:00) (97/76 - 142/70)  BP(mean): 71 (05 Jan 2023 06:00) (49 - 94)  ABP: 96/72 (04 Jan 2023 17:00) (96/48 - 122/68)  ABP(mean): 81 (04 Jan 2023 17:00) (66 - 89)  RR: 21 (05 Jan 2023 07:00) (13 - 25)  SpO2: 100% (05 Jan 2023 07:00) (90% - 100%)    O2 Parameters below as of 05 Jan 2023 07:00  Patient On (Oxygen Delivery Method): nasal cannula w/ humidification  O2 Flow (L/min): 2    --------------------------------------------------------------------------------------    EXAM    General: NAD, resting in chair comfortably.  Cardiac: regular rate, warm and well perfused  Respiratory: Nonlabored respirations, normal cw expansion.  Chest: Dressing cdi, soft, no collections, ARCHIE x2 to wall suction with SS output  Extremities: normal strength, FROM, no deformities    --------------------------------------------------------------------------------------    LABS                          11.3   9.82  )-----------( 181      ( 05 Jan 2023 04:26 )             35.0   01-05    137  |  100  |  10  ----------------------------<  109<H>  4.4   |  29  |  0.65    Ca    8.6      05 Jan 2023 04:26  Phos  2.2     01-05  Mg     2.00     01-05    TPro  6.8  /  Alb  3.8  /  TBili  0.4  /  DBili  x   /  AST  28  /  ALT  13  /  AlkPhos  53  01-04      --------------------------------------------------------------------------------------    INS AND OUTS:    I&O's Detail    04 Jan 2023 07:01  -  05 Jan 2023 07:00  --------------------------------------------------------  IN:    IV PiggyBack: 450 mL    Lactated Ringers: 720 mL  Total IN: 1170 mL    OUT:    Chest Tube (mL): 120 mL    Chest Tube (mL): 70 mL    Drain (mL): 300 mL    Indwelling Catheter - Urethral (mL): 1220 mL  Total OUT: 1710 mL    Total NET: -540 mL    --------------------------------------------------------------------------------------
  CHIEF COMPLAINT: FOLLOW UP IN ICU FOR POSTOPERATIVE CARE OF PATIENT WHO IS S/P MEDIASTINAL MASS EXCISION      PROCEDURES: En bloc thymectomy with partial sternectomy and resection of anterior right ribs 2-4 and wedge resection of right middle lobe. Reconstruction of chest wall with MMA patch. 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 Pain is improved with use of pain meds.     PHYSICAL EXAM:   Gen: Comfortable, No acute distress  Eyes: Sclera white, Conjunctiva normal, Eyelids normal, Pupils symmetrical   ENT: Mucous membranes moist,  ,  ,    Neck: Trachea midline,  ,  ,  ,  ,  ,    CV: Rate regular, Rhythm regular,  ,  ,    Resp: Breath sounds clear, No accessory muscles use, Four chest tubes,  ,    Abd: Soft, Non-distended, Non-tender, Bowel sounds normal,  ,  ,    Skin: Warm, No peripheral edema of lower extremities,  ,    : No rubalcava  Neuro: Moving all 4 extremities,    Psych: A&Ox3      ASSESSMENT AND PLAN:     NEURO:  Post-operative Pain - Stable. Pain control with PCEA and Tylenol IV PRN.        RESPIRATORY:  Stable on room air - Incentive spirometry. Chest PT and suctioning of secretions. Out of bed to chair and ambulate with assistance. Continuous pulse oximetry for support & to prevent decompensation.       Chest tube – Pleurevac regulated suctioning. Monitor chest tube output. Discontinue vanessa drain. Continue plastic surgery drains.     CARDIOVASCULAR:  Hemodynamically stable - Not on pressors. Continue hemodynamic monitoring.  Telemetry (medical test) - Reviewed by me today independently. Normal sinus rhythm.        RENAL:  Stable - Monitor IOs and electrolytes. Keep K above 4.0 and Mg above 2.0.      GASTROINTESTINAL:  GI prophylaxis not indicated  Zofran and Reglan IV PRN for nausea  Regular consistency diet         HEMATOLOGIC:  No signs of active bleeding. Monitor Hgb in CBC in AM  DVT prophylaxis with heparin subQ and SCDs.       INFECTIOUS DISEASE:  No signs of active infection. Will monitor for fever and leukocytosis.         ENDOCRINE:  Stable – Monitor glucose fingersticks for goal 120-180.          ONCOLOGY:  Thymic cancer s/p chemo and RT (9/2022) - Improved. S/P resection. Follow up final pathology.           Pertinent clinical, laboratory, radiographic, hemodynamic, echocardiographic, respiratory data, microbiologic data and chart were reviewed by myself and analyzed frequently throughout the course of the day and night by myself.    Plan discussed at length with the CTICU staff and Attending CT Surgeon -   Dr Amairani Cummings      Patient's status was discussed with patient at bedside.     	    _________________________  VITAL SIGNS:  Vital Signs Last 24 Hrs  T(C): 37 (06 Jan 2023 12:00), Max: 37.1 (05 Jan 2023 16:00)  T(F): 98.6 (06 Jan 2023 12:00), Max: 98.8 (05 Jan 2023 16:00)  HR: 91 (06 Jan 2023 12:00) (78 - 95)  BP: 88/59 (06 Jan 2023 11:00) (84/56 - 118/79)  BP(mean): 69 (06 Jan 2023 11:00) (65 - 93)  RR: 25 (06 Jan 2023 12:00) (17 - 25)  SpO2: 94% (06 Jan 2023 12:00) (92% - 100%)    Parameters below as of 06 Jan 2023 12:00  Patient On (Oxygen Delivery Method): room air      I/Os:   I&O's Detail    05 Jan 2023 07:01  -  06 Jan 2023 07:00  --------------------------------------------------------  IN:    IV PiggyBack: 250 mL    Lactated Ringers: 270 mL    Oral Fluid: 240 mL  Total IN: 760 mL    OUT:    Chest Tube (mL): 80 mL    Chest Tube (mL): 30 mL    Drain (mL): 20 mL    Indwelling Catheter - Urethral (mL): 210 mL    Voided (mL): 1450 mL  Total OUT: 1790 mL    Total NET: -1030 mL      06 Jan 2023 07:01  -  06 Jan 2023 12:20  --------------------------------------------------------  IN:    Oral Fluid: 480 mL  Total IN: 480 mL    OUT:    Chest Tube (mL): 0 mL    Chest Tube (mL): 20 mL    Drain (mL): 20 mL    Voided (mL): 450 mL  Total OUT: 490 mL    Total NET: -10 mL              MEDICATIONS:  MEDICATIONS  (STANDING):  acetaminophen   IVPB .. 750 milliGRAM(s) IV Intermittent once  acetaminophen   IVPB .. 750 milliGRAM(s) IV Intermittent every 6 hours  albuterol    0.083% 2.5 milliGRAM(s) Nebulizer every 8 hours  famotidine    Tablet 20 milliGRAM(s) Oral every 12 hours  heparin   Injectable 5000 Unit(s) SubCutaneous every 12 hours  ketorolac   Injectable 15 milliGRAM(s) IV Push every 6 hours  lidocaine   4% Patch 1 Patch Transdermal daily  polyethylene glycol 3350 17 Gram(s) Oral daily  ropivacaine 0.2% in Sodium Chloride PCEA 200 milliLiter(s) Epidural PCA Continuous  senna 2 Tablet(s) Oral at bedtime  sodium chloride 3%  Inhalation 4 milliLiter(s) Inhalation every 8 hours    MEDICATIONS  (PRN):  metoclopramide Injectable 10 milliGRAM(s) IV Push every 8 hours PRN nausea and/or vomitting  naloxone Injectable 0.1 milliGRAM(s) IV Push every 3 minutes PRN For ANY of the following changes in patient status:  A. RR LESS THAN 10 breaths per minute, B. Oxygen saturation LESS THAN 90%, C. Sedation score of 6  ondansetron Injectable 4 milliGRAM(s) IV Push every 6 hours PRN Nausea  oxyCODONE    IR 5 milliGRAM(s) Oral every 3 hours PRN Moderate-Severe Pain (4 - 10)      LABS:  Laboratory data was independently reviewed by me today.                           10.6   8.12  )-----------( 163      ( 06 Jan 2023 02:48 )             32.5     01-06    140  |  104  |  13  ----------------------------<  103<H>  4.3   |  25  |  0.58    Ca    8.6      06 Jan 2023 02:48  Phos  2.7     01-06  Mg     2.20     01-06        PT/INR - ( 06 Jan 2023 02:48 )   PT: 12.3 sec;   INR: 1.06 ratio         PTT - ( 06 Jan 2023 02:48 )  PTT:30.5 sec        RADIOLOGY:   Radiology images were independently reviewed by me today. Reports were reviewed by me today.    Xray Chest 1 View- PORTABLE-Urgent:   ACC: [id number] EXAM:  XR CHEST PORTABLE URGENT 1V                          PROCEDURE DATE:  01/05/2023          INTERPRETATION:  EXAMINATION: XR CHEST URGENT    CLINICAL INDICATION: CT to WS    TECHNIQUE: Single frontal, portable view of the chest was obtained.    COMPARISON: Chest x-ray 1/5/2023 556 exam.    FINDINGS:  Right chest tube and mediastinal drainage catheters.  The heart size is similar.  New small to moderate right lateral pneumothorax.  There are no acute osseous abnormalities.    IMPRESSION:  New small to moderate right lateral pneumothorax.    Findings were discussed with EDGAR Maradiaga by Dr. Harvey at approximately   3:02PM on 01/05/2023.    --- End of Report ---          ABDULKADIR HARVEY MD; Resident Radiologist  This document hasbeen electronically signed.  SUDARSHAN MORALES MD; Attending Interventional Radiologist  This document has been electronically signed. Jan 5 2023  3:05PM (01-05-23 @ 12:45)  Xray Chest 1 View- PORTABLE-Routine:   ACC: [id number] EXAM:  XR CHEST PORTABLE ROUTINE 1V                        ACC: [id number] EXAM:  XR CHEST PORTABLE URGENT 1V                          PROCEDURE DATE:  01/03/2023          INTERPRETATION:  EXAMINATION: XR CHEST URGENT, XR CHEST    CLINICAL INDICATION: Post-cardiac surgery    TECHNIQUE: Multiple, frontal portable views of the chest were obtained.    COMPARISON: Chest x-ray 11/29/2022.    FINDINGS:  Chest x-ray 1/3/2023 7:07 PM:  Right chest tubes and mediastinal drains.  The heart size is not well evaluated in this projection.  Mild right base atelectasis.  No focal consolidation, pneumothorax, or pleural effusion.    Chest x-ray 1/4/2023 6:18 AM:  Bilateral chest tubes and catheters unchanged.  Mild basilar atelectasis. Otherwise, no focal consolidation,   pneumothorax, or pleural effusion.    IMPRESSION:  Bilateral chest tubes and mediastinal drainage catheters.    No focal consolidation, pneumothorax, or pleural effusion.    --- End of Report ---          ABDULKADIR HARVEY MD;Resident Radiologist  This document has been electronically signed.  SUDARSHAN MORALES MD; Attending Interventional Radiologist  This document has been electronically signed. Jan 4 2023 10:23AM (01-04-23 @ 06:35)  Xray Chest 1 View- PORTABLE-Urgent:   ACC: [id number] EXAM:  XR CHEST PORTABLE ROUTINE 1V                        ACC: [id number] EXAM:  XR CHEST PORTABLE URGENT 1V                          PROCEDURE DATE:  01/03/2023          INTERPRETATION:  EXAMINATION: XR CHEST URGENT, XR CHEST    CLINICAL INDICATION: Post-cardiac surgery    TECHNIQUE: Multiple, frontal portable views of the chest were obtained.    COMPARISON: Chest x-ray 11/29/2022.    FINDINGS:  Chest x-ray 1/3/2023 7:07 PM:  Right chest tubes and mediastinal drains.  The heart size is not well evaluated in this projection.  Mild right base atelectasis.  No focal consolidation, pneumothorax, or pleural effusion.    Chest x-ray 1/4/2023 6:18 AM:  Bilateral chest tubes and catheters unchanged.  Mild basilar atelectasis. Otherwise, no focal consolidation,   pneumothorax, or pleural effusion.    IMPRESSION:  Bilateral chest tubes and mediastinal drainage catheters.    No focal consolidation, pneumothorax, or pleural effusion.    --- End of Report ---          ABDULKADIR HARVEY MD;Resident Radiologist  This document has been electronically signed.  SUDARSHAN MORALES MD; Attending Interventional Radiologist  This document has been electronically signed. Jan 4 2023 10:23AM (01-03-23 @ 19:23)  
ALONSO HUITRON      62y   Female   [id number]         No Known Allergies             Daily     Daily Drug Dosing Weight  Height (cm): 160 (03 Jan 2023 11:43)  Weight (kg): 59 (03 Jan 2023 11:43)  BMI (kg/m2): 23 (03 Jan 2023 11:43)  BSA (m2): 1.61 (03 Jan 2023 11:43)    Pt is a 62 y.o. female ;pt speaks Mandarin ; requested daughter  in law Hill Huitron to translate.  Pt reports h/o Thymic  cancer ; tx with chemo / RT completed 9/22.  Pt presents for preop eval to have resection of sternal anterior mediastinal mass on 1/3/22 (19 Dec 2022 11:10)      CHIEF COMPLAINT: Follow up in ICU  for postoperative care of patient who is s/p En bloc thymectomy with partial sternectomy and resection of anterior right ribs 2-4 and wedge resection of right middle lobe.    Procedure: En bloc thymectomy with partial sternectomy and resection of anterior right ribs 2-4 and wedge resection of right middle lobe. Reconstruction of chest wall with MMA patch. 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 Pain is improved with use of PCA and  oral pain meds.         Home Medications:  Tylenol: pt unsure dosage ; 2 tabs prn  (03 Jan 2023 11:53)    PAST MEDICAL & SURGICAL HISTORY:  Mediastinal mass      Thymic cancer      S/P appendectomy  2003      History of lung biopsy        Vital Signs Last 24 Hrs  T(C): 36.8 (08 Jan 2023 08:00), Max: 37.2 (07 Jan 2023 16:00)  T(F): 98.3 (08 Jan 2023 08:00), Max: 99 (07 Jan 2023 16:00)  HR: 107 (08 Jan 2023 08:00) (81 - 107)  BP: 125/93 (08 Jan 2023 08:00) (100/69 - 147/88)  BP(mean): 106 (08 Jan 2023 08:00) (76 - 106)  RR: 19 (08 Jan 2023 08:00) (16 - 36)  SpO2: 99% (08 Jan 2023 08:00) (93% - 100%)    Parameters below as of 08 Jan 2023 09:00  Patient On (Oxygen Delivery Method): room air      I&O's Detail    07 Jan 2023 07:01  -  08 Jan 2023 07:00  --------------------------------------------------------  IN:    Oral Fluid: 1080 mL  Total IN: 1080 mL    OUT:    Chest Tube (mL): 160 mL    Drain (mL): 30 mL    Voided (mL): 3000 mL  Total OUT: 3190 mL    Total NET: -2110 mL      08 Jan 2023 07:01  -  08 Jan 2023 08:48  --------------------------------------------------------  IN:  Total IN: 0 mL    OUT:    Chest Tube (mL): 30 mL    Voided (mL): 100 mL  Total OUT: 130 mL    Total NET: -130 mL        CAPILLARY BLOOD GLUCOSE        Home Medications:  Tylenol: pt unsure dosage ; 2 tabs prn  (03 Jan 2023 11:53)    MEDICATIONS  (STANDING):  acetaminophen   IVPB .. 750 milliGRAM(s) IV Intermittent once  albuterol    0.083% 2.5 milliGRAM(s) Nebulizer every 8 hours  famotidine    Tablet 20 milliGRAM(s) Oral every 12 hours  heparin   Injectable 5000 Unit(s) SubCutaneous every 12 hours  influenza   Vaccine 0.5 milliLiter(s) IntraMuscular once  ketorolac   Injectable 15 milliGRAM(s) IV Push every 6 hours  lidocaine   4% Patch 1 Patch Transdermal daily  polyethylene glycol 3350 17 Gram(s) Oral daily  senna 2 Tablet(s) Oral at bedtime  sodium chloride 3%  Inhalation 4 milliLiter(s) Inhalation every 8 hours    MEDICATIONS  (PRN):  HYDROmorphone  Injectable 0.3 milliGRAM(s) IV Push every 3 hours PRN Severe Breakthrough Pain (7 - 10)  metoclopramide Injectable 10 milliGRAM(s) IV Push every 8 hours PRN nausea and/or vomitting  naloxone Injectable 0.1 milliGRAM(s) IV Push every 3 minutes PRN For ANY of the following changes in patient status:  A. RR LESS THAN 10 breaths per minute, B. Oxygen saturation LESS THAN 90%, C. Sedation score of 6  ondansetron Injectable 4 milliGRAM(s) IV Push every 6 hours PRN Nausea  oxyCODONE    IR 5 milliGRAM(s) Oral every 3 hours PRN Moderate-Severe Pain (4 - 10)        Physical exam:                            General:               Pt is awake, alert,  appears to be in pain but not in distress                                                  Neuro:                  Nonfocal                             Psych:                   A&Ox3                          Cardiovascular:   S1 & S2, regular                           Respiratory:         Air entry is fair and equal on both sides, has bilateral conducted sounds                           GI:                          Soft, nondistended and nontender, Bowel sounds active                            Ext:                        No cyanosis or edema     Labs:                                                                           11.5   3.68  )-----------( 220      ( 08 Jan 2023 04:40 )             34.7             01-08    137  |  101  |  13  ----------------------------<  115<H>  4.3   |  25  |  0.55    Ca    9.0      08 Jan 2023 04:40  Phos  4.6     01-08  Mg     2.20     01-08                    PT/INR - ( 08 Jan 2023 04:40 )   PT: 11.1 sec;   INR: 0.96 ratio         PTT - ( 08 Jan 2023 04:40 )  PTT:31.1 sec        CXR:    < from: Xray Chest 1 View- PORTABLE-Routine (01.07.23 @ 06:21) >  IMPRESSION: Follow-up with right-sided chest tube and persistent small   hydropneumothorax.        Plan:  General:  62yFemale s/p En bloc thymectomy with partial sternectomy and resection of anterior right ribs 2-4 and wedge resection of right middle lobe. Reconstruction of chest wall with MMA patch. Bilateral pectoralis advancement flaps and closure by plastic surgery 03-Jan-2023 , experiencing  pain with deep breathing.                             Neuro:                                         Pain control with Oxy  /  Tylenol PRN                            Cardiovascular:                                          Telemetry (medical test) - Reviewed by me today independently. Normal sinus rhythm.                                          Continue hemodynamic monitoring to prevent decompensation.                            Respiratory:                                         Postop hypoxemia - Resolved                                              Obtain CXR . Encourage incentive spirometry.                                                   Chest PT and frequent suctioning. Continue bronchodilators, Pulmozyme and inhaled 3% saline inhalations.                                                      OOB to chair & ambulate w/ assistance.                                                           Continuous pulse oximetry for support & to prevent decompensation.                                         Monitor chest tube output                                         Chest tube to WS, may be d/cd today                                                                                        GI     Nausea: Continue Zofran / Reglan  - PRN                                         On puree diet, advance to regular diet as tolerated                                         Continue bowel regimen. 	                                                                 Renal:                                                                                  Monitor I/Os and electrolytes                                                                                        Hem/ Onc:                                         DVT prophylaxis with SQ Heparin and SCDs                                         Monitor chest tube output &  signs of bleeding.                                          Follow CBC in AM                           Infectious disease:                                            Monitor for fever / leukocytosis.                                          All surgical incision / chest tube  sites look clean                            Endocrine                                             Continue Accu-Checks with coverage                                                  Pertinent clinical, laboratory, radiographic, hemodynamic, echocardiographic, respiratory data, microbiologic data and chart were reviewed and analyzed frequently throughout the course of the day and night.     Patient seen, examined and plan discussed with CT Surgeon  / CTICU team during rounds.    OOB to chair and ambulate with physical therapy as tolerated.     Status discussed with patient and updated plan of care.     I have spent 40 minutes with this patient including 20 minutes of time coordinating care in the ICU.            Jose Dudley MD                                                                    
ALONSO HUITRON      62y   Female   MRN-[id number]         No Known Allergies             Daily Height in cm: 160.02 (2023 11:43)    Daily Weight in k.2 (2023 01:00)Drug Dosing Weight  Height (cm): 160 (2023 11:43)  Weight (kg): 59 (2023 11:43)  BMI (kg/m2): 23 (2023 11:43)  BSA (m2): 1.61 (2023 11:43)    HPI:  Pt is a 62 y.o. female ;pt speaks Mandarin ; requested daughter  in law Hill Huitron to translate.  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  Pt presents for preop eval to have resection of sternal anterior mediastinal mass on 1/3/22 (19 Dec 2022 11:10)      CHIEF COMPLAINT: Follow up in ICU  for postoperative care of patient who is s/p En bloc thymectomy with partial sternectomy and resection of anterior right ribs 2-4 and wedge resection of right middle lobe.    Procedure: En bloc thymectomy with partial sternectomy and resection of anterior right ribs 2-4 and wedge resection of right middle lobe. Reconstruction of chest wall with MMA patch. Bilateral pectoralis advancement flaps and closure by plastic surgery 2023                       Issues:  Thymic cancer s/p chemo and RT ()  Post op pain  Chest tube in place  Postop Nausea    Postop course:     Patient reports moderate pain at chest wall incision sites which is worse with coughing and deep breathing without associated fever or dyspnea. Pain is improved with use of PCA and  oral pain meds.         Home Medications:  Tylenol: pt unsure dosage ; 2 tabs prn  (2023 11:53)    PAST MEDICAL & SURGICAL HISTORY:  Mediastinal mass      Thymic cancer      S/P appendectomy  2003      History of lung biopsy        Vital Signs Last 24 Hrs  T(C): 37.2 (2023 08:00), Max: 37.2 (2023 08:00)  T(F): 99 (2023 08:00), Max: 99 (2023 08:00)  HR: 80 (2023 09:00) (71 - 101)  BP: 100/54 (2023 07:00) (100/54 - 129/76)  BP(mean): 65 (2023 07:00) (65 - 87)  RR: 20 (2023 09:00) (11 - 24)  SpO2: 98% (2023 09:00) (93% - 100%)    Parameters below as of 2023 09:00  Patient On (Oxygen Delivery Method): nasal cannula w/ humidification  O2 Flow (L/min): 1    I&O's Detail    2023 07:01  -  2023 07:00  --------------------------------------------------------  IN:    IV PiggyBack: 175 mL    Lactated Ringers: 360 mL  Total IN: 535 mL    OUT:    Chest Tube (mL): 110 mL    Chest Tube (mL): 340 mL    Drain (mL): 0 mL    Drain (mL): 200 mL    Indwelling Catheter - Urethral (mL): 1130 mL  Total OUT: 1780 mL    Total NET: -1245 mL      2023 07:01  -  2023 09:13  --------------------------------------------------------  IN:    Lactated Ringers: 60 mL  Total IN: 60 mL    OUT:    Chest Tube (mL): 0 mL    Chest Tube (mL): 0 mL    Indwelling Catheter - Urethral (mL): 40 mL  Total OUT: 40 mL    Total NET: 20 mL        CAPILLARY BLOOD GLUCOSE        Home Medications:  Tylenol: pt unsure dosage ; 2 tabs prn  (2023 11:53)    MEDICATIONS  (STANDING):  acetaminophen   IVPB .. 750 milliGRAM(s) IV Intermittent once  acetaminophen   IVPB .. 750 milliGRAM(s) IV Intermittent every 6 hours  albumin human  5% IVPB 250 milliLiter(s) IV Intermittent once  albuterol    0.083% 2.5 milliGRAM(s) Nebulizer every 8 hours  famotidine    Tablet 20 milliGRAM(s) Oral every 12 hours  heparin   Injectable 5000 Unit(s) SubCutaneous every 12 hours  hydromorphone (10 MICROgram(s)/mL) + bupivacaine 0.0625% in 0.9% Sodium Chloride PCEA 250 milliLiter(s) Epidural PCA Continuous  lactated ringers. 1000 milliLiter(s) (30 mL/Hr) IV Continuous <Continuous>  lidocaine   4% Patch 1 Patch Transdermal daily  polyethylene glycol 3350 17 Gram(s) Oral daily  senna 2 Tablet(s) Oral at bedtime  sodium chloride 3%  Inhalation 4 milliLiter(s) Inhalation every 8 hours    MEDICATIONS  (PRN):  hydromorphone (10 MICROgram(s)/mL) + bupivacaine 0.0625% in 0.9% Sodium Chloride PCEA Rescue Clinician  Bolus 5 milliLiter(s) Epidural every 15 minutes PRN for Pain Score greater than 6  ketorolac   Injectable 15 milliGRAM(s) IV Push every 6 hours PRN Moderate Pain (4 - 6)  metoclopramide Injectable 10 milliGRAM(s) IV Push every 8 hours PRN nausea and/or vomitting  naloxone Injectable 0.1 milliGRAM(s) IV Push every 3 minutes PRN For ANY of the following changes in patient status:  A. RR LESS THAN 10 breaths per minute, B. Oxygen saturation LESS THAN 90%, C. Sedation score of 6  ondansetron Injectable 4 milliGRAM(s) IV Push every 6 hours PRN Nausea        Physical exam:                             General:               Pt is awake, alert,  appears to be in pain but not in distress                                                  Neuro:                  Nonfocal                             Psych:                   A&Ox3                          Cardiovascular:   S1 & S2, regular                           Respiratory:         Air entry is fair and equal on both sides, has bilateral conducted sounds                           GI:                          Soft, nondistended and nontender, Bowel sounds active                            Ext:                        No cyanosis or edema     Labs:                                                                           11.8   10.55 )-----------( 190      ( 2023 03:14 )             36.4             01-04    134<L>  |  99  |  11  ----------------------------<  152<H>  4.1   |  24  |  0.56    Ca    8.2<L>      2023 03:14  Phos  4.2     01-04  Mg     2.50     01-04    TPro  6.8  /  Alb  3.8  /  TBili  0.4  /  DBili  x   /  AST  28  /  ALT  13  /  AlkPhos  53  01-04                  PT/INR - ( 2023 03:14 )   PT: 12.0 sec;   INR: 1.03 ratio         PTT - ( 2023 03:14 )  PTT:30.2 sec  LIVER FUNCTIONS - ( 2023 03:14 )  Alb: 3.8 g/dL / Pro: 6.8 g/dL / ALK PHOS: 53 U/L / ALT: 13 U/L / AST: 28 U/L / GGT: x               CXR:  < from: Xray Chest 1 View- PORTABLE-Urgent (Xray Chest 1 View- PORTABLE-Urgent .) (11.29.22 @ 15:32) >  The lungs are clear. The heart and mediastinum are difficult to evaluate   on this view. No effusions, complicating pneumomediastinum or   pneumothorax post endobronchial ultrasound.    COMPARISON: No prior exams available.    IMPRESSION: No complications post endobronchial ultrasound.          Plan:  General: 62yFemale s/p En bloc thymectomy with partial sternectomy and resection of anterior right ribs 2-4 and wedge resection of right middle lobe. Reconstruction of chest wall with MMA patch. Bilateral pectoralis advancement flaps and closure by plastic surgery 2023 , experiencing  pain with deep breathing.                             Neuro:                                         Pain control with PCEA /  Tylenol PRN                            Cardiovascular:                                          Telemetry (medical test) - Reviewed by me today independently. Normal sinus rhythm.                                          Continue hemodynamic monitoring to prevent decompensation.                            Respiratory:                                         Postop hypoxemia requiring O2 via nasal cannula probably due to postop pain - Wean nasal cannula for goal O2sat above 92%.                                              Obtain CXR . Encourage incentive spirometry.                                                   Chest PT and frequent suctioning. Continue bronchodilators, Pulmozyme and inhaled 3% saline inhalations.                                                      OOB to chair & ambulate w/ assistance.                                                           Continuous pulse oximetry for support & to prevent decompensation.                                         Monitor chest tube output                                         Chest tube to WS                                                                                         GI     Nausea: Continue Zofran / Reglan  - PRN                                         On puree diet, advance to regular diet as tolerated                                         Continue bowel regimen	                                                                 Renal:                                         Continue LR  30cc/hr                                         Monitor I/Os and electrolytes                                                                                        Hem/ Onc:                                         DVT prophylaxis with SQ Heparin and SCDs                                         Monitor chest tube output &  signs of bleeding.                                          Follow CBC in AM                           Infectious disease:                                            Monitor for fever / leukocytosis.                                          All surgical incision / chest tube  sites look clean                            Endocrine                                             Continue Accu-Checks with coverage                                                  Pertinent clinical, laboratory, radiographic, hemodynamic, echocardiographic, respiratory data, microbiologic data and chart were reviewed and analyzed frequently throughout the course of the day and night.     Patient seen, examined and plan discussed with CT Surgeon Dr. Mcqueen / CTICU team during rounds.    OOB to chair and ambulate with physical therapy as tolerated.     Status discussed with patient and updated plan of care.     I have spent 40 minutes with this patient including 20 minutes of time coordinating care in the ICU.          Jose Dudley MD                                                                    
Anesthesia Pain Management Service- Attending Addendum    SUBJECTIVE: Pt doing well with PCEA without problems reported.    Therapy:	  [ ] IV PCA	   [ X] Epidural           [ ] s/p Spinal Opoid              [ ] Postpartum infusion	  [ ] Patient controlled regional anesthesia (PCRA)    [ ] prn Analgesics    Allergies    No Known Allergies    Intolerances      MEDICATIONS  (STANDING):  acetaminophen   IVPB .. 750 milliGRAM(s) IV Intermittent once  albuterol    0.083% 2.5 milliGRAM(s) Nebulizer every 8 hours  famotidine    Tablet 20 milliGRAM(s) Oral every 12 hours  heparin   Injectable 5000 Unit(s) SubCutaneous every 12 hours  ketorolac   Injectable 15 milliGRAM(s) IV Push every 6 hours  lactated ringers. 1000 milliLiter(s) (30 mL/Hr) IV Continuous <Continuous>  lidocaine   4% Patch 1 Patch Transdermal daily  polyethylene glycol 3350 17 Gram(s) Oral daily  ropivacaine 0.2% in Sodium Chloride PCEA 200 milliLiter(s) Epidural PCA Continuous  senna 2 Tablet(s) Oral at bedtime  sodium chloride 3%  Inhalation 4 milliLiter(s) Inhalation every 8 hours    MEDICATIONS  (PRN):  HYDROmorphone  Injectable 0.3 milliGRAM(s) IV Push every 3 hours PRN Severe Breakthrough Pain (7 - 10)  metoclopramide Injectable 10 milliGRAM(s) IV Push every 8 hours PRN nausea and/or vomitting  naloxone Injectable 0.1 milliGRAM(s) IV Push every 3 minutes PRN For ANY of the following changes in patient status:  A. RR LESS THAN 10 breaths per minute, B. Oxygen saturation LESS THAN 90%, C. Sedation score of 6  ondansetron Injectable 4 milliGRAM(s) IV Push every 6 hours PRN Nausea  oxyCODONE    IR 5 milliGRAM(s) Oral every 3 hours PRN Moderate-Severe Pain (4 - 10)      OBJECTIVE:   [X] No new signs     [ ] Other:    Side Effects:  [X ] None			[ ] Other:    Assessment of Catheter Site:		[ X] Intact		[ ] Other:    ASSESSMENT/PLAN  [ X] Continue current therapy    [ ] Therapy changed to:    [ ] IV PCA       [ ] Epidural     [ ] prn Analgesics     Comments: Continue current PCEA settings.    Note written after patient seen
Anesthesia Pain Management Service: PostOp Day _2_ of Epidural + IV PCA    SUBJECTIVE: Patient states her pain was better controlled yesterday. Today, her pain is 10/10 and it hurts when she is sitting. However, IV PCA has been helping when she presses button. Patient not nauseous or vomiting today.  Pain Scale Score: 10/10  Refer to charted pain scores    THERAPY:  [ ] Epidural Bupivacaine 0.0625% and Hydromorphone  		[ ] 10 micrograms/mL	[ ] 5 micrograms/mL  [ ] Epidural Bupivacaine 0.0625% and Fentanyl - 2 micrograms/mL  [x ] Epidural Ropivacaine 0.2% plain – 2 mg/mL  [ ] Patient Controlled Regional Anesthesia (PCRA) Ropivacaine  		[ ] 0.2%			[ ] 0.1%    Demand dose __0_ lockout __0_ (minutes) Continuous Rate __2ml/hr_ Total: __41.05__ ml used/in 24 hr    PLUS    IV PCA Hydromorphone 1mg/ml : Demand Dose__0.15mg_   Lockout__6__(minutes)    Contiunous Rate_0_    Total:__0.9__mg used/ 24 hr    MEDICATIONS  (STANDING):  acetaminophen   IVPB .. 750 milliGRAM(s) IV Intermittent once  albuterol    0.083% 2.5 milliGRAM(s) Nebulizer every 8 hours  famotidine    Tablet 20 milliGRAM(s) Oral every 12 hours  heparin   Injectable 5000 Unit(s) SubCutaneous every 12 hours  ketorolac   Injectable 15 milliGRAM(s) IV Push every 6 hours  lactated ringers. 1000 milliLiter(s) (30 mL/Hr) IV Continuous <Continuous>  lidocaine   4% Patch 1 Patch Transdermal daily  polyethylene glycol 3350 17 Gram(s) Oral daily  ropivacaine 0.2% in Sodium Chloride PCEA 200 milliLiter(s) Epidural PCA Continuous  senna 2 Tablet(s) Oral at bedtime  sodium chloride 3%  Inhalation 4 milliLiter(s) Inhalation every 8 hours    MEDICATIONS  (PRN):  HYDROmorphone  Injectable 0.3 milliGRAM(s) IV Push every 3 hours PRN Severe Breakthrough Pain (7 - 10)  metoclopramide Injectable 10 milliGRAM(s) IV Push every 8 hours PRN nausea and/or vomitting  naloxone Injectable 0.1 milliGRAM(s) IV Push every 3 minutes PRN For ANY of the following changes in patient status:  A. RR LESS THAN 10 breaths per minute, B. Oxygen saturation LESS THAN 90%, C. Sedation score of 6  ondansetron Injectable 4 milliGRAM(s) IV Push every 6 hours PRN Nausea  oxyCODONE    IR 5 milliGRAM(s) Oral every 3 hours PRN Moderate-Severe Pain (4 - 10)      OBJECTIVE: Patient sitting up in chair, CTx2    Assessment of Catheter Site:	[ ] Left	[ ] Right  [x ] Epidural 	[ ] Femoral	      [ ] Saphenous   [ ] Supraclavicular   [ ] Other:    [x ] Dressing intact	[x ] Site non-tender	[ x] Site without erythema, discharge, edema  [x ] Epidural tubing and connection checked	[x] Gross neurological exam within normal limits  [ ] Catheter removed – tip intact		[ ] Afebrile	    [ ] Febrile: ___    [ X] see Temp under VS below)    PT/INR - ( 05 Jan 2023 04:26 )   PT: 12.9 sec;   INR: 1.11 ratio         PTT - ( 05 Jan 2023 04:26 )  PTT:32.6 sec                      11.3   9.82  )-----------( 181      ( 05 Jan 2023 04:26 )             35.0     Vital Signs Last 24 Hrs  T(C): 37.4 (01-05-23 @ 08:00), Max: 37.4 (01-05-23 @ 08:00)  T(F): 99.4 (01-05-23 @ 08:00), Max: 99.4 (01-05-23 @ 08:00)  HR: 85 (01-05-23 @ 09:04) (76 - 104)  BP: 102/65 (01-05-23 @ 09:00) (97/76 - 142/70)  BP(mean): 76 (01-05-23 @ 09:00) (49 - 94)  RR: 20 (01-05-23 @ 09:00) (13 - 25)  SpO2: 97% (01-05-23 @ 09:04) (90% - 100%)      Sedation Score:	[x ] Alert	[ ] Drowsy	[ ] Arousable	[ ] Asleep	[ ] Unresponsive    Side Effects:	[x ] None	[ ] Nausea	[ ] Vomiting	[ ] Pruritus  		[ ] Weakness		[ ] Numbness	[ ] Other:    ASSESSMENT/ PLAN:    Therapy to  be:	[x ] Continue   [ ] Discontinued   [ ] Change to prn Analgesics    Documentation and Verification of current medications:  [ X ] Done	[ ] Not done, not eligible, reason:    Comments: Discussed patient with CTICU team, IV PCA discontinued. Dermatome levels checked and scattered coverage from T1-T6 primarily on right side. Ropivacaine continuous rate increased to 4ml/hr. Ketorolac Q6H standing x2 days ordered and okay with primary team. PRN oral/IV opioids ordered along with non-opioid adjuvants.     Progress Note written now but Patient was seen earlier.
Anesthesia Pain Management Service: PostOp Day _4_ of Epidural    SUBJECTIVE: Patient doing well with PCEA and no problems.  Pain Scale Score: 7/10  Refer to charted pain scores    THERAPY:  [ ] Epidural Bupivacaine 0.0625% and Hydromorphone  		[X ] 10 micrograms/mL	[ ] 5 micrograms/mL  [ ] Epidural Bupivacaine 0.0625% and Fentanyl - 2 micrograms/mL  [x ] Epidural Ropivacaine 0.1% plain – 1 mg/mL  [ ] Patient Controlled Regional Anesthesia (PCRA) Ropivacaine  		[ ] 0.2%			[ ] 0.1%    Demand dose __0_ lockout __0_ (minutes) Continuous Rate _4__ Total: _92.9__ Daily      MEDICATIONS  (STANDING):  acetaminophen   IVPB .. 750 milliGRAM(s) IV Intermittent once  acetaminophen   IVPB .. 750 milliGRAM(s) IV Intermittent every 6 hours  albuterol    0.083% 2.5 milliGRAM(s) Nebulizer every 8 hours  famotidine    Tablet 20 milliGRAM(s) Oral every 12 hours  heparin   Injectable 5000 Unit(s) SubCutaneous every 12 hours  influenza   Vaccine 0.5 milliLiter(s) IntraMuscular once  ketorolac   Injectable 15 milliGRAM(s) IV Push every 6 hours  lidocaine   4% Patch 1 Patch Transdermal daily  polyethylene glycol 3350 17 Gram(s) Oral daily  senna 2 Tablet(s) Oral at bedtime  sodium chloride 3%  Inhalation 4 milliLiter(s) Inhalation every 8 hours    MEDICATIONS  (PRN):  HYDROmorphone  Injectable 0.3 milliGRAM(s) IV Push every 3 hours PRN Severe Breakthrough Pain (7 - 10)  metoclopramide Injectable 10 milliGRAM(s) IV Push every 8 hours PRN nausea and/or vomitting  naloxone Injectable 0.1 milliGRAM(s) IV Push every 3 minutes PRN For ANY of the following changes in patient status:  A. RR LESS THAN 10 breaths per minute, B. Oxygen saturation LESS THAN 90%, C. Sedation score of 6  ondansetron Injectable 4 milliGRAM(s) IV Push every 6 hours PRN Nausea  oxyCODONE    IR 5 milliGRAM(s) Oral every 3 hours PRN Moderate-Severe Pain (4 - 10)      OBJECTIVE: Patient sitting up in chair, CTx1    Assessment of Catheter Site:	[ ] Left	[ ] Right  [x ] Epidural 	[ ] Femoral	      [ ] Saphenous   [ ] Supraclavicular   [ ] Other:    [x ] Dressing intact	[x ] Site non-tender	[ x] Site without erythema, discharge, edema  [x ] Epidural tubing and connection checked	[x] Gross neurological exam within normal limits  [X ] Catheter removed – tip intact		[ ] Afebrile	  [ ] Febrile: ___       [ X] see Temp under VS below)    PT/INR - ( 07 Jan 2023 03:20 )   PT: 11.5 sec;   INR: 0.99 ratio         PTT - ( 07 Jan 2023 03:20 )  PTT:27.4 sec                      10.8   4.87  )-----------( 189      ( 07 Jan 2023 03:20 )             33.6     Vital Signs Last 24 Hrs  T(C): 36.7 (01-07-23 @ 08:00), Max: 37 (01-06-23 @ 12:00)  T(F): 98.1 (01-07-23 @ 08:00), Max: 98.6 (01-06-23 @ 12:00)  HR: 88 (01-07-23 @ 10:00) (76 - 97)  BP: 134/90 (01-07-23 @ 11:00) (92/59 - 134/90)  BP(mean): 104 (01-07-23 @ 11:00) (70 - 104)  RR: 22 (01-07-23 @ 11:00) (16 - 30)  SpO2: 98% (01-07-23 @ 11:00) (93% - 100%)      Sedation Score:	[x ] Alert	[ ] Drowsy	[ ] Arousable	[ ] Asleep	[ ] Unresponsive    Side Effects:	[x ] None	[ ] Nausea	[ ] Vomiting	[ ] Pruritus  		[ ] Weakness		[ ] Numbness	[ ] Other:    ASSESSMENT/ PLAN:    Therapy to  be:	[ ] Continue   [ X] Discontinued   [ X] Change to prn Analgesics    Documentation and Verification of current medications:  [ X ] Done	[ ] Not done, not eligible, reason:    Comments: Discussed patient with primary team, PCEA discontinued. Changed to IV/oral opioid and/or non-opioid Adjuvant analgesics to be used at this point.    Progress Note written now but Patient was seen earlier.
Anesthesia Pain Management Service: PostOp Day _4_ of Epidural    SUBJECTIVE: Patient doing well with PCEA and no problems.  Pain Scale Score: 7/10  Refer to charted pain scores    THERAPY:  [ ] Epidural Bupivacaine 0.0625% and Hydromorphone  		[X ] 10 micrograms/mL	[ ] 5 micrograms/mL  [ ] Epidural Bupivacaine 0.0625% and Fentanyl - 2 micrograms/mL  [x ] Epidural Ropivacaine 0.1% plain – 1 mg/mL  [ ] Patient Controlled Regional Anesthesia (PCRA) Ropivacaine  		[ ] 0.2%			[ ] 0.1%    Demand dose __0_ lockout __0_ (minutes) Continuous Rate _4__ Total: _92.9__ Daily      MEDICATIONS  (STANDING):  acetaminophen   IVPB .. 750 milliGRAM(s) IV Intermittent once  acetaminophen   IVPB .. 750 milliGRAM(s) IV Intermittent every 6 hours  albuterol    0.083% 2.5 milliGRAM(s) Nebulizer every 8 hours  famotidine    Tablet 20 milliGRAM(s) Oral every 12 hours  heparin   Injectable 5000 Unit(s) SubCutaneous every 12 hours  influenza   Vaccine 0.5 milliLiter(s) IntraMuscular once  ketorolac   Injectable 15 milliGRAM(s) IV Push every 6 hours  lidocaine   4% Patch 1 Patch Transdermal daily  polyethylene glycol 3350 17 Gram(s) Oral daily  senna 2 Tablet(s) Oral at bedtime  sodium chloride 3%  Inhalation 4 milliLiter(s) Inhalation every 8 hours    MEDICATIONS  (PRN):  HYDROmorphone  Injectable 0.3 milliGRAM(s) IV Push every 3 hours PRN Severe Breakthrough Pain (7 - 10)  metoclopramide Injectable 10 milliGRAM(s) IV Push every 8 hours PRN nausea and/or vomitting  naloxone Injectable 0.1 milliGRAM(s) IV Push every 3 minutes PRN For ANY of the following changes in patient status:  A. RR LESS THAN 10 breaths per minute, B. Oxygen saturation LESS THAN 90%, C. Sedation score of 6  ondansetron Injectable 4 milliGRAM(s) IV Push every 6 hours PRN Nausea  oxyCODONE    IR 5 milliGRAM(s) Oral every 3 hours PRN Moderate-Severe Pain (4 - 10)      OBJECTIVE: Patient sitting up in chair, CTx1    Assessment of Catheter Site:	[ ] Left	[ ] Right  [x ] Epidural 	[ ] Femoral	      [ ] Saphenous   [ ] Supraclavicular   [ ] Other:    [x ] Dressing intact	[x ] Site non-tender	[ x] Site without erythema, discharge, edema  [x ] Epidural tubing and connection checked	[x] Gross neurological exam within normal limits  [X ] Catheter removed – tip intact		[ ] Afebrile	  [ ] Febrile: ___       [ X] see Temp under VS below)    PT/INR - ( 07 Jan 2023 03:20 )   PT: 11.5 sec;   INR: 0.99 ratio         PTT - ( 07 Jan 2023 03:20 )  PTT:27.4 sec                      10.8   4.87  )-----------( 189      ( 07 Jan 2023 03:20 )             33.6     Vital Signs Last 24 Hrs  T(C): 36.7 (01-07-23 @ 08:00), Max: 37 (01-06-23 @ 12:00)  T(F): 98.1 (01-07-23 @ 08:00), Max: 98.6 (01-06-23 @ 12:00)  HR: 88 (01-07-23 @ 10:00) (76 - 97)  BP: 134/90 (01-07-23 @ 11:00) (92/59 - 134/90)  BP(mean): 104 (01-07-23 @ 11:00) (70 - 104)  RR: 22 (01-07-23 @ 11:00) (16 - 30)  SpO2: 98% (01-07-23 @ 11:00) (93% - 100%)      Sedation Score:	[x ] Alert	[ ] Drowsy	[ ] Arousable	[ ] Asleep	[ ] Unresponsive    Side Effects:	[x ] None	[ ] Nausea	[ ] Vomiting	[ ] Pruritus  		[ ] Weakness		[ ] Numbness	[ ] Other:    ASSESSMENT/ PLAN:    Therapy to  be:	[ ] Continue   [ X] Discontinued   [ X] Change to prn Analgesics    Documentation and Verification of current medications:  [ X ] Done	[ ] Not done, not eligible, reason:    Comments: Discussed patient with primary team, PCEA discontinued. Changed to IV/oral opioid and/or non-opioid Adjuvant analgesics to be used at this point.    Progress Note written now but Patient was seen earlier.  
CHIEF COMPLAINT: FOLLOW UP IN ICU FOR POSTOPERATIVE CARE OF PATIENT WHO IS S/P MEDIASTINAL MASS EXCISION      PROCEDURES:     En bloc thymectomy with partial sternectomy and resection of anterior right ribs 2-4 and wedge resection of right middle lobe. Reconstruction of chest wall with MMA patch. 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 Pain is improved with use of pain meds.       PHYSICAL EXAM:     Gen: Comfortable, No acute distress  Eyes: Sclera white, Conjunctiva normal, Eyelids normal, Pupils symmetrical   ENT: Mucous membranes moist,  ,  ,    Neck: Trachea midline,  ,  ,  ,  ,  ,    CV: Rate regular, Rhythm regular,  ,  ,    Resp: Breath sounds clear, No accessory muscles use, Four chest tubes,  ,    Abd: Soft, Non-distended, Non-tender, Bowel sounds normal,  ,  ,    Skin: Warm, No peripheral edema of lower extremities,  ,    : No rubalcava  Neuro: Moving all 4 extremities,    Psych: A&Ox3      ASSESSMENT AND PLAN:     NEURO:  Post-operative Pain - Stable. Pain control with Tylenol IV PRN.        RESPIRATORY:  Stable on room air - Incentive spirometry. Chest PT and suctioning of secretions. Out of bed to chair and ambulate with assistance. Continuous pulse oximetry for support & to prevent decompensation.        Continue plastic surgery drains.       CARDIOVASCULAR:  Hemodynamically stable - Not on pressors. Continue hemodynamic monitoring.  Telemetry (medical test) - Reviewed by me today independently. Normal sinus rhythm.        RENAL:  Stable - Monitor IOs and electrolytes. Keep K above 4.0 and Mg above 2.0.      GASTROINTESTINAL:  GI prophylaxis not indicated  Zofran and Reglan IV PRN for nausea  Regular consistency diet         HEMATOLOGIC:  No signs of active bleeding. Monitor Hgb in CBC in AM  DVT prophylaxis with heparin subQ and SCDs.       INFECTIOUS DISEASE:  No signs of active infection. Will monitor for fever and leukocytosis.         ENDOCRINE:  Stable – Monitor glucose fingersticks for goal 120-180.          ONCOLOGY:  Thymic cancer s/p chemo and RT (9/2022) - Improved. S/P resection. Follow up final pathology.           Pertinent clinical, laboratory, radiographic, hemodynamic, echocardiographic, respiratory data, microbiologic data and chart were reviewed by myself and analyzed frequently throughout the course of the day and night by myself.    Plan discussed at length with the CTICU staff and Attending CT Surgeon -   Dr Chandler Mcqueen      Patient's status was discussed with patient at bedside.    _________________________  VITAL SIGNS:  Vital Signs Last 24 Hrs  T(C): 37.1 (09 Jan 2023 22:16), Max: 37.1 (09 Jan 2023 22:16)  T(F): 98.8 (09 Jan 2023 22:16), Max: 98.8 (09 Jan 2023 22:16)  HR: 92 (09 Jan 2023 22:16) (87 - 109)  BP: 105/73 (09 Jan 2023 22:16) (96/72 - 116/78)  BP(mean): 80 (09 Jan 2023 19:00) (76 - 757)  RR: 19 (09 Jan 2023 22:16) (14 - 26)  SpO2: 96% (09 Jan 2023 22:16) (94% - 100%)    Parameters below as of 09 Jan 2023 22:16  Patient On (Oxygen Delivery Method): room air      I/Os:   I&O's Detail    08 Jan 2023 07:01  -  09 Jan 2023 07:00  --------------------------------------------------------  IN:    Oral Fluid: 1160 mL  Total IN: 1160 mL    OUT:    Chest Tube (mL): 30 mL    Drain (mL): 0 mL    Voided (mL): 2900 mL  Total OUT: 2930 mL    Total NET: -1770 mL      09 Jan 2023 07:01  -  10 Walter 2023 00:18  --------------------------------------------------------  IN:    Oral Fluid: 720 mL  Total IN: 720 mL    OUT:    Drain (mL): 5 mL    Voided (mL): 1475 mL  Total OUT: 1480 mL    Total NET: -760 mL              MEDICATIONS:  MEDICATIONS  (STANDING):  acetaminophen   IVPB .. 750 milliGRAM(s) IV Intermittent once  famotidine    Tablet 20 milliGRAM(s) Oral every 12 hours  heparin   Injectable 5000 Unit(s) SubCutaneous every 12 hours  ketorolac   Injectable 15 milliGRAM(s) IV Push every 6 hours  levalbuterol Inhalation 0.63 milliGRAM(s) Inhalation every 6 hours  lidocaine   4% Patch 1 Patch Transdermal daily  polyethylene glycol 3350 17 Gram(s) Oral daily  senna 2 Tablet(s) Oral at bedtime  sodium chloride 3%  Inhalation 4 milliLiter(s) Inhalation every 8 hours    MEDICATIONS  (PRN):  HYDROmorphone  Injectable 0.3 milliGRAM(s) IV Push every 3 hours PRN Severe Breakthrough Pain (7 - 10)  metoclopramide Injectable 10 milliGRAM(s) IV Push every 8 hours PRN nausea and/or vomitting  naloxone Injectable 0.1 milliGRAM(s) IV Push every 3 minutes PRN For ANY of the following changes in patient status:  A. RR LESS THAN 10 breaths per minute, B. Oxygen saturation LESS THAN 90%, C. Sedation score of 6  ondansetron Injectable 4 milliGRAM(s) IV Push every 6 hours PRN Nausea  oxyCODONE    IR 5 milliGRAM(s) Oral every 3 hours PRN Moderate-Severe Pain (4 - 10)      LABS:  Laboratory data was independently reviewed by me today.                           11.5   3.86  )-----------( 222      ( 09 Jan 2023 04:25 )             35.1     01-09    133<L>  |  99  |  13  ----------------------------<  116<H>  4.3   |  25  |  0.65    Ca    9.2      09 Jan 2023 04:25  Phos  5.4     01-09  Mg     2.30     01-09        PT/INR - ( 08 Jan 2023 04:40 )   PT: 11.1 sec;   INR: 0.96 ratio         PTT - ( 08 Jan 2023 04:40 )  PTT:31.1 sec        RADIOLOGY:   Radiology images were independently reviewed by me today. Reports were reviewed by me today.    Xray Chest 1 View- PORTABLE-Routine:   ACC: [id number] EXAM:  XR CHEST PORTABLE ROUTINE 1V                          PROCEDURE DATE:  01/09/2023          INTERPRETATION:  Chest one view    HISTORY: Postop    COMPARISON STUDY: 1/8/2023    Frontal expiratory view of the chest shows the heartto be similar in   size. Mediastinal catheters are again noted.    The lungs are clear with reduced pleural effusions and there is no   evidence of pneumothorax.    IMPRESSION:  Smaller effusions.        Thank you for the courtesy of this referral.    --- End of Report ---            SUDARSHAN MORALES MD; Attending Interventional Radiologist  This document has been electronically signed. Jan 9 2023  1:22PM (01-09-23 @ 05:56)  Xray Chest 1 View- PORTABLE-Urgent:   ACC: [id number] EXAM:  XR CHEST PORTABLE URGENT 1V                          PROCEDURE DATE:  01/08/2023          INTERPRETATION:  CLINICAL INFORMATION: Chest tube removal    TIME OF EXAMINATION: January 8 at 11:21 AM    EXAM: Portable chest    FINDINGS:  Since the last study, the chest tube has been removed. Small residual   pneumothorax is present. No other changes.        COMPARISON: January 8 at 6:43 AM        IMPRESSION: Status post chest tube removal.    --- End of Report ---            CLARITZA RIVER MD; Attending Radiologist  This document has been electronically signed. Jan 8 2023  1:06PM (01-08-23 @ 11:42)  Xray Chest 1 View- PORTABLE-Routine:   ACC: [id number] EXAM:  XR CHEST PORTABLE ROUTINE 1V                          PROCEDURE DATE:  01/08/2023          INTERPRETATION:  CLINICAL INFORMATION: Mediastinal mass resection    TIME OF EXAMINATION: January 8 at 6:43 AM    EXAM: Portable chest    FINDINGS:  Right-sided chest tube and mediastinal drains unchanged. Lungs are clear.   Heart size is stable and no pneumomediastinum. Stable right apical small   pneumothorax.        COMPARISON: January 7        IMPRESSION: Follow-up post mediastinal mass resection.    --- End of Report ---            CLARITZA RIVER MD; Attending Radiologist  This document has been electronically signed. Jan 8 2023 10:18AM (01-08-23 @ 07:17)       	
No acute events overnight.  Patient is Mandarin speaking,  Karina ([phone]) used for communication.    Patient is doing well overall.   She reports she still has sternal pain but it is managed by medication.  She reports she is tolerating PO intake with no N/V and had a BM overnight. She denies difficulty with urination.  She reports she is OOB and ambulating.  All questions addressed.    Exam:  Awake and alert, sitting up in chair in NAD  Tachycardic to 101 on monitor, RRR  Saturating 100% on RA  Sternal incision c/d/i with prineo dressing in place. ARCHIE drains with minimal serosang output, to bulb suction. No collections or erythema.  No calf pain bilaterally, no LE swelling b/l.
No c/o    OOB  chest dressing removed  incis c/d/i  JPs serosang    Doing well  cont JPs   as per thoracic surgery
Plastic Surgery    SUBJECTIVE: Pt seen and examined on rounds with team. Last ARCHIE drain fell out overnight. Stitch taken out at bedside this AM.    VITALS  Vital Signs Last 24 Hrs  T(C): 36.9 (10 Walter 2023 05:51), Max: 37.1 (09 Jan 2023 22:16)  T(F): 98.4 (10 Walter 2023 05:51), Max: 98.8 (09 Jan 2023 22:16)  HR: 94 (10 Walter 2023 05:51) (87 - 109)  BP: 110/85 (10 Walter 2023 05:51) (96/72 - 116/78)  BP(mean): 80 (09 Jan 2023 19:00) (76 - 90)  ABP: --  ABP(mean): --  RR: 17 (10 Walter 2023 05:51) (14 - 26)  SpO2: 96% (10 Awlter 2023 05:51) (94% - 100%)    O2 Parameters below as of 10 Walter 2023 05:51  Patient On (Oxygen Delivery Method): room air      I&O's Detail    08 Jan 2023 07:01  -  09 Jan 2023 07:00  --------------------------------------------------------  IN:    Oral Fluid: 1160 mL  Total IN: 1160 mL    OUT:    Chest Tube (mL): 30 mL    Drain (mL): 0 mL    Voided (mL): 2900 mL  Total OUT: 2930 mL    Total NET: -1770 mL      09 Jan 2023 07:01  -  10 Walter 2023 06:49  --------------------------------------------------------  IN:    Oral Fluid: 1170 mL  Total IN: 1170 mL    OUT:    Drain (mL): 10 mL    Voided (mL): 1975 mL  Total OUT: 1985 mL    Total NET: -815 mL    Exam:  Gen: comfortable, sitting in chair  Pulm: breathing comfortably on room air  Chest: Midline thoracic incision with prineo in place, cdi no palpable collections.       MEDICATIONS (STANDING): acetaminophen   IVPB .. 750 milliGRAM(s) IV Intermittent once  famotidine    Tablet 20 milliGRAM(s) Oral every 12 hours  heparin   Injectable 5000 Unit(s) SubCutaneous every 12 hours  influenza   Vaccine 0.5 milliLiter(s) IntraMuscular once  ketorolac   Injectable 15 milliGRAM(s) IV Push every 6 hours  levalbuterol Inhalation 0.63 milliGRAM(s) Inhalation every 6 hours  polyethylene glycol 3350 17 Gram(s) Oral daily  senna 2 Tablet(s) Oral at bedtime  sodium chloride 3%  Inhalation 4 milliLiter(s) Inhalation every 8 hours    MEDICATIONS (PRN):HYDROmorphone  Injectable 0.3 milliGRAM(s) IV Push every 3 hours PRN Severe Breakthrough Pain (7 - 10)  metoclopramide Injectable 10 milliGRAM(s) IV Push every 8 hours PRN nausea and/or vomitting  naloxone Injectable 0.1 milliGRAM(s) IV Push every 3 minutes PRN For ANY of the following changes in patient status:  A. RR LESS THAN 10 breaths per minute, B. Oxygen saturation LESS THAN 90%, C. Sedation score of 6  ondansetron Injectable 4 milliGRAM(s) IV Push every 6 hours PRN Nausea  oxyCODONE    IR 5 milliGRAM(s) Oral every 3 hours PRN Moderate-Severe Pain (4 - 10)      LABS  CBC (01-09 @ 04:25)                              11.5                           3.86    )----------------(  222        --    % Neutrophils, --    % Lymphocytes, ANC: --                                  35.1    CBC (01-08 @ 04:40)                              11.5                           3.68<L>  )----------------(  220        --    % Neutrophils, --    % Lymphocytes, ANC: --                                  34.7      BMP (01-09 @ 04:25)             133<L>  |  99      |  13    		Ca++ --      Ca 9.2                ---------------------------------( 116<H>		Mg 2.30               4.3     |  25      |  0.65  			Ph 5.4<H>  BMP (01-08 @ 04:40)             137     |  101     |  13    		Ca++ --      Ca 9.0                ---------------------------------( 115<H>		Mg 2.20               4.3     |  25      |  0.55  			Ph 4.6<H>      Kurt (01-08 @ 04:40)  aPTT 31.1 / INR 0.96 / PT 11.1            IMAGING STUDIES    
Stable. Pain at surgical sites.    Chest tube intact, drains serosang, stripped. Dressing on chest CDI.    AP - Stable. Further care per CTICU  Continue Luan drains and chest wall dressing.  
no events    small air leak in a chest bulb placed quique wall suction    dressing intact]  chest drains appropriately functioning    cont care per CTICU  cont vanessa drains  maintain dressing
Patient is Mandarin speaking. Interpretor Alejandra ([phone]) used.  Patient reports her pain is controlled and rates about a 6 right now.  Per RN, she had 4 episodes of emesis overnight so the PCEA was lowered and patient is receiving anti-emetic medication.    General: awake and alert, sitting up in chair, NAD  Neuro: A&O  Cardiac: RRR  Pulm: 2L NC, 97% saturation  Chest: Midline dressing c/d/i, no signs of surrounding fluid collection or infection, no ecchymosis, bilateral ARCHIE drains to wall suction SS output, surgical bra in place, Pleurevac
Plastic Surgery    SUBJECTIVE: Pt seen and examined on rounds with team. NAEON.      VITALS  T(C): 36.3 (01-09-23 @ 04:00), Max: 37.8 (01-09-23 @ 00:00)  HR: 93 (01-09-23 @ 07:00) (86 - 111)  BP: 100/71 (01-09-23 @ 07:00) (96/72 - 134/82)  RR: 22 (01-09-23 @ 07:00) (19 - 38)  SpO2: 97% (01-09-23 @ 07:00) (89% - 100%)  CAPILLARY BLOOD GLUCOSE          Is/Os    01-08 @ 07:01  -  01-09 @ 07:00  --------------------------------------------------------  IN:    Oral Fluid: 1160 mL  Total IN: 1160 mL    OUT:    Chest Tube (mL): 30 mL    Drain (mL): 0 mL    Voided (mL): 2900 mL  Total OUT: 2930 mL    Total NET: -1770 mL      Exam:  Gen: comfortable, sitting in chair  Pulm: breathing comfortably on room air  Chest: Midline thoracic incision with prineo in place, cdi no palpable collections.       MEDICATIONS (STANDING): acetaminophen   IVPB .. 750 milliGRAM(s) IV Intermittent once  famotidine    Tablet 20 milliGRAM(s) Oral every 12 hours  heparin   Injectable 5000 Unit(s) SubCutaneous every 12 hours  influenza   Vaccine 0.5 milliLiter(s) IntraMuscular once  ketorolac   Injectable 15 milliGRAM(s) IV Push every 6 hours  levalbuterol Inhalation 0.63 milliGRAM(s) Inhalation every 6 hours  polyethylene glycol 3350 17 Gram(s) Oral daily  senna 2 Tablet(s) Oral at bedtime  sodium chloride 3%  Inhalation 4 milliLiter(s) Inhalation every 8 hours    MEDICATIONS (PRN):HYDROmorphone  Injectable 0.3 milliGRAM(s) IV Push every 3 hours PRN Severe Breakthrough Pain (7 - 10)  metoclopramide Injectable 10 milliGRAM(s) IV Push every 8 hours PRN nausea and/or vomitting  naloxone Injectable 0.1 milliGRAM(s) IV Push every 3 minutes PRN For ANY of the following changes in patient status:  A. RR LESS THAN 10 breaths per minute, B. Oxygen saturation LESS THAN 90%, C. Sedation score of 6  ondansetron Injectable 4 milliGRAM(s) IV Push every 6 hours PRN Nausea  oxyCODONE    IR 5 milliGRAM(s) Oral every 3 hours PRN Moderate-Severe Pain (4 - 10)      LABS  CBC (01-09 @ 04:25)                              11.5                           3.86    )----------------(  222        --    % Neutrophils, --    % Lymphocytes, ANC: --                                  35.1    CBC (01-08 @ 04:40)                              11.5                           3.68<L>  )----------------(  220        --    % Neutrophils, --    % Lymphocytes, ANC: --                                  34.7      BMP (01-09 @ 04:25)             133<L>  |  99      |  13    		Ca++ --      Ca 9.2                ---------------------------------( 116<H>		Mg 2.30               4.3     |  25      |  0.65  			Ph 5.4<H>  BMP (01-08 @ 04:40)             137     |  101     |  13    		Ca++ --      Ca 9.0                ---------------------------------( 115<H>		Mg 2.20               4.3     |  25      |  0.55  			Ph 4.6<H>      Coags (01-08 @ 04:40)  aPTT 31.1 / INR 0.96 / PT 11.1            IMAGING STUDIES    
Plastic Surgery Daily Progress Note  =====================================================    SUBJECTIVE: Patient seen and examined at bedside on AM rounds.     PMH:  s/p thymic mass resection by CT surgery and bilateral pec flap advancement by PRS     ALLERGIES:  No Known Allergies      --------------------------------------------------------------------------------------    MEDICATIONS:    Neurologic Medications  acetaminophen   IVPB .. 750 milliGRAM(s) IV Intermittent once  acetaminophen   IVPB .. 750 milliGRAM(s) IV Intermittent every 6 hours  hydromorphone (10 MICROgram(s)/mL) + bupivacaine 0.0625% in 0.9% Sodium Chloride PCEA 250 milliLiter(s) Epidural PCA Continuous  hydromorphone (10 MICROgram(s)/mL) + bupivacaine 0.0625% in 0.9% Sodium Chloride PCEA Rescue Clinician  Bolus 5 milliLiter(s) Epidural every 15 minutes PRN for Pain Score greater than 6  ketorolac   Injectable 15 milliGRAM(s) IV Push every 6 hours PRN Moderate Pain (4 - 6)  ondansetron Injectable 4 milliGRAM(s) IV Push every 6 hours PRN Nausea    Respiratory Medications  albuterol    0.083% 2.5 milliGRAM(s) Nebulizer every 8 hours  sodium chloride 3%  Inhalation 4 milliLiter(s) Inhalation every 8 hours    Cardiovascular Medications    Gastrointestinal Medications  albumin human  5% IVPB 250 milliLiter(s) IV Intermittent once  famotidine    Tablet 20 milliGRAM(s) Oral every 12 hours  lactated ringers. 1000 milliLiter(s) IV Continuous <Continuous>  polyethylene glycol 3350 17 Gram(s) Oral daily  senna 2 Tablet(s) Oral at bedtime    Genitourinary Medications    Hematologic/Oncologic Medications  heparin   Injectable 5000 Unit(s) SubCutaneous every 12 hours    Antimicrobial/Immunologic Medications    Endocrine/Metabolic Medications    Topical/Other Medications  lidocaine   4% Patch 1 Patch Transdermal daily  naloxone Injectable 0.1 milliGRAM(s) IV Push every 3 minutes PRN For ANY of the following changes in patient status:  A. RR LESS THAN 10 breaths per minute, B. Oxygen saturation LESS THAN 90%, C. Sedation score of 6    --------------------------------------------------------------------------------------    VITAL SIGNS:  ICU Vital Signs Last 24 Hrs  T(C): 36.4 (06 Jan 2023 08:00), Max: 37.1 (05 Jan 2023 16:00)  T(F): 97.6 (06 Jan 2023 08:00), Max: 98.8 (05 Jan 2023 16:00)  HR: 87 (06 Jan 2023 10:00) (78 - 95)  BP: 101/71 (06 Jan 2023 10:00) (84/56 - 118/79)  BP(mean): 81 (06 Jan 2023 10:00) (65 - 93)  ABP: --  ABP(mean): --  RR: 23 (06 Jan 2023 10:00) (17 - 25)  SpO2: 100% (06 Jan 2023 10:00) (92% - 100%)    O2 Parameters below as of 06 Jan 2023 10:00  Patient On (Oxygen Delivery Method): room air    --------------------------------------------------------------------------------------    EXAM    General: NAD, resting in chair comfortably.  Cardiac: regular rate, warm and well perfused  Respiratory: Nonlabored respirations, normal cw expansion.  Chest: Dressing cdi, soft, no collections, ARCHIE x2 to wall suction with SS output  Extremities: normal strength, FROM, no deformities    --------------------------------------------------------------------------------------    LABS                          10.6   8.12  )-----------( 163      ( 06 Jan 2023 02:48 )             32.5   01-06    140  |  104  |  13  ----------------------------<  103<H>  4.3   |  25  |  0.58    Ca    8.6      06 Jan 2023 02:48  Phos  2.7     01-06  Mg     2.20     01-06    PT/INR - ( 06 Jan 2023 02:48 )   PT: 12.3 sec;   INR: 1.06 ratio         PTT - ( 06 Jan 2023 02:48 )  PTT:30.5 sec      --------------------------------------------------------------------------------------    INS AND OUTS:    I&O's Detail    05 Jan 2023 07:01  -  06 Jan 2023 07:00  --------------------------------------------------------  IN:    IV PiggyBack: 250 mL    Lactated Ringers: 270 mL    Oral Fluid: 240 mL  Total IN: 760 mL    OUT:    Chest Tube (mL): 80 mL    Chest Tube (mL): 30 mL    Drain (mL): 20 mL    Indwelling Catheter - Urethral (mL): 210 mL    Voided (mL): 1450 mL  Total OUT: 1790 mL    Total NET: -1030 mL      06 Jan 2023 07:01  -  06 Jan 2023 10:37  --------------------------------------------------------  IN:    Oral Fluid: 240 mL  Total IN: 240 mL    OUT:    Chest Tube (mL): 0 mL    Chest Tube (mL): 10 mL    Drain (mL): 20 mL    Voided (mL): 300 mL  Total OUT: 330 mL    Total NET: -90 mL      --------------------------------------------------------------------------------------
Plastic Surgery Daily Progress Note  =====================================================    SUBJECTIVE: Patient seen and examined at bedside on AM rounds.     PMH:  s/p thymic mass resection by CT surgery and bilateral pec flap advancement by PRS     ALLERGIES:  No Known Allergies      --------------------------------------------------------------------------------------    MEDICATIONS:    Neurologic Medications  acetaminophen   IVPB .. 750 milliGRAM(s) IV Intermittent once  acetaminophen   IVPB .. 750 milliGRAM(s) IV Intermittent every 6 hours  hydromorphone (10 MICROgram(s)/mL) + bupivacaine 0.0625% in 0.9% Sodium Chloride PCEA 250 milliLiter(s) Epidural PCA Continuous  hydromorphone (10 MICROgram(s)/mL) + bupivacaine 0.0625% in 0.9% Sodium Chloride PCEA Rescue Clinician  Bolus 5 milliLiter(s) Epidural every 15 minutes PRN for Pain Score greater than 6  ketorolac   Injectable 15 milliGRAM(s) IV Push every 6 hours PRN Moderate Pain (4 - 6)  ondansetron Injectable 4 milliGRAM(s) IV Push every 6 hours PRN Nausea    Respiratory Medications  albuterol    0.083% 2.5 milliGRAM(s) Nebulizer every 8 hours  sodium chloride 3%  Inhalation 4 milliLiter(s) Inhalation every 8 hours    Cardiovascular Medications    Gastrointestinal Medications  albumin human  5% IVPB 250 milliLiter(s) IV Intermittent once  famotidine    Tablet 20 milliGRAM(s) Oral every 12 hours  lactated ringers. 1000 milliLiter(s) IV Continuous <Continuous>  polyethylene glycol 3350 17 Gram(s) Oral daily  senna 2 Tablet(s) Oral at bedtime    Genitourinary Medications    Hematologic/Oncologic Medications  heparin   Injectable 5000 Unit(s) SubCutaneous every 12 hours    Antimicrobial/Immunologic Medications    Endocrine/Metabolic Medications    Topical/Other Medications  lidocaine   4% Patch 1 Patch Transdermal daily  naloxone Injectable 0.1 milliGRAM(s) IV Push every 3 minutes PRN For ANY of the following changes in patient status:  A. RR LESS THAN 10 breaths per minute, B. Oxygen saturation LESS THAN 90%, C. Sedation score of 6    --------------------------------------------------------------------------------------    VITAL SIGNS:  Vital Signs Last 24 Hrs  T(C): 36.8 (04 Jan 2023 04:00), Max: 36.8 (03 Jan 2023 11:43)  T(F): 98.2 (04 Jan 2023 04:00), Max: 98.2 (03 Jan 2023 11:43)  HR: 85 (04 Jan 2023 07:15) (71 - 101)  BP: 108/74 (03 Jan 2023 23:00) (105/75 - 129/76)  BP(mean): 85 (03 Jan 2023 23:00) (85 - 87)  ABP: 107/51 (04 Jan 2023 06:00) (97/44 - 139/76)  ABP(mean): 70 (04 Jan 2023 06:00) (63 - 103)  RR: 11 (04 Jan 2023 06:00) (11 - 24)  SpO2: 97% (04 Jan 2023 07:15) (93% - 100%)    O2 Parameters below as of 04 Jan 2023 07:15  Patient On (Oxygen Delivery Method): nasal cannula w/ humidification      --------------------------------------------------------------------------------------    EXAM    General: NAD, resting in chair comfortably.  Cardiac: regular rate, warm and well perfused  Respiratory: Nonlabored respirations, normal cw expansion.  Chest: Dressing cdi, soft, no collections, ARCHIE x2 to wall suction with SS output  Extremities: normal strength, FROM, no deformities    --------------------------------------------------------------------------------------    LABS                          11.8   10.55 )-----------( 190      ( 04 Jan 2023 03:14 )             36.4     01-04    134<L>  |  99  |  11  ----------------------------<  152<H>  4.1   |  24  |  0.56    Ca    8.2<L>      04 Jan 2023 03:14  Phos  4.2     01-04  Mg     2.50     01-04    TPro  6.8  /  Alb  3.8  /  TBili  0.4  /  DBili  x   /  AST  28  /  ALT  13  /  AlkPhos  53  01-04    --------------------------------------------------------------------------------------    INS AND OUTS:    I&O's Detail    03 Jan 2023 07:01  -  04 Jan 2023 07:00  --------------------------------------------------------  IN:    IV PiggyBack: 175 mL    Lactated Ringers: 360 mL  Total IN: 535 mL    OUT:    Chest Tube (mL): 110 mL    Chest Tube (mL): 340 mL    Drain (mL): 0 mL    Drain (mL): 200 mL    Indwelling Catheter - Urethral (mL): 1130 mL  Total OUT: 1780 mL    Total NET: -1245 mL  --------------------------------------------------------------------------------------

## 2023-01-10 NOTE — DISCHARGE NOTE PROVIDER - NSDCFUADDINST_GEN_ALL_CORE_FT
Keep the wounds clean and dry.  Use your surgical bra.  Drain care by the visiting nurse.  Call Dr Mcqueen or plastic surgery for fevers or wound issues. Walk 4-5 x per day. Increase as tolerated. You may climb stairs. Continue to use your incentive spirometer 10x/hour. Keep soft support bra in place. Remove to shower or for sleeping  Can shower daily and leave wounds uncovered. Sutures will be removed in the office. If any old drain sites still draining some fluid, just cover with gauze and tape  Change gauze dressing as needed if gets saturated.

## 2023-01-10 NOTE — DISCHARGE NOTE PROVIDER - PROVIDER TOKENS
PROVIDER:[TOKEN:[[id number]],FOLLOWUP:[2 weeks],ESTABLISHEDPATIENT:[T]],PROVIDER:[TOKEN:[[id number]],FOLLOWUP:[1 week],ESTABLISHEDPATIENT:[T]] PROVIDER:[TOKEN:[[id number]],FOLLOWUP:[Routine],ESTABLISHEDPATIENT:[T]],PROVIDER:[TOKEN:[[id number]]]

## 2023-01-10 NOTE — DISCHARGE NOTE PROVIDER - NSDCCPTREATMENT_GEN_ALL_CORE_FT
PRINCIPAL PROCEDURE  Procedure: Thymectomy, sternal approach  Findings and Treatment:       SECONDARY PROCEDURE  Procedure: Right pectoralis advancement flap  Findings and Treatment:     Procedure: Right pectoralis advancement flap  Findings and Treatment:     Procedure: Sternectomy  Findings and Treatment:

## 2023-01-13 LAB — SURGICAL PATHOLOGY STUDY: SIGNIFICANT CHANGE UP

## 2023-01-19 ENCOUNTER — APPOINTMENT (OUTPATIENT)
Dept: THORACIC SURGERY | Facility: CLINIC | Age: 63
End: 2023-01-19
Payer: MEDICAID

## 2023-01-19 VITALS
OXYGEN SATURATION: 98 % | BODY MASS INDEX: 23.59 KG/M2 | WEIGHT: 129 LBS | DIASTOLIC BLOOD PRESSURE: 70 MMHG | RESPIRATION RATE: 18 BRPM | HEART RATE: 92 BPM | TEMPERATURE: 97.7 F | SYSTOLIC BLOOD PRESSURE: 106 MMHG

## 2023-01-19 PROCEDURE — 99024 POSTOP FOLLOW-UP VISIT: CPT

## 2023-01-19 NOTE — PHYSICAL EXAM
[] : no respiratory distress [Auscultation Breath Sounds / Voice Sounds] : lungs were clear to auscultation bilaterally [Heart Rate And Rhythm] : heart rate was normal and rhythm regular [Heart Sounds] : normal S1 and S2 [Heart Sounds Gallop] : no gallops [Murmurs] : no murmurs [Heart Sounds Pericardial Friction Rub] : no pericardial rub [Clean] : clean [Dry] : dry [Healing Well] : healing well

## 2023-01-20 RX ORDER — GABAPENTIN 300 MG/1
300 CAPSULE ORAL
Qty: 90 | Refills: 3 | Status: ACTIVE | COMMUNITY
Start: 2023-01-20 | End: 1900-01-01

## 2023-01-23 NOTE — REASON FOR VISIT
[Other: _____] : [unfilled] [de-identified] : Sternectomy, En bloc resection of partial right second, third, and fourth ribs, Radical excision of thymic carcinoma with total thymectomy, En bloc wedge resection of the portion of right upper lobe and right middle lobe of the lung, Subtotal pericardectomy, Reconstruction using composite mesh and methyl methacrylate [de-identified] : 1/3/23

## 2023-01-23 NOTE — DISCUSSION/SUMMARY
[Slow Progress] : is progressing slowly [Excellent Pain Control] : has excellent pain control [No Sign of Infection] : is showing no signs of infection [4] : 4 [Remove Sutures/Staples] : removed sutures/staples [de-identified] : takes Tylenol as needed.

## 2023-01-23 NOTE — ASSESSMENT
[FreeTextEntry1] : Ms. ALONSO HUITRON, 62 year old female, never a smoker, with no significant PMHX, who c/o middle chest pain radiated to right lateral chest since 09/2021, had CXR x2, findings were unremarkable. CT chest on 05/07/2022 showed anterior mediastinal mass. \par \par CT chest on 05/07/2022:\par - 4.0 x 2.8 x 4.1 cm (2: 60 and 6: 71) solid right anterior mediastinal soft tissue mass containing few punctate internal calcifications\par - calcified right hilar nodules present. \par - a 3 mm DOUG nodule (3: 74)\par - a 3 mm calcified granuloma in RUL (3: 26)\par - 4 mm calcified LL granuloma (3: 97)\par - a 4 mm RLL subpleural nodule (3: 104), tiny internal fat\par \par PET/CT on 6/13/22:\par - large active mass in the anterior mediastinum 5.1 x 3.2 cm, SUV=12.7, activity in the adjacent aspect of the sternum, SUV=6.7\par \par CT Chest w/IV contrast on 6/13/22:\par - anterior inferior mediastinum is a solid lobulated mass, 5.6 x 4.1 x 2.6 cm, the mass indents the anterior aspect of the Rt atrium\par - subpleural 4 mm solid nodule in RLL (3:106)\par - stable 3 and 4 mm nodules abutting minor fissure (3:89, 94)\par \par Now s/p FNA of anterior mediastinal mass on 7/8/22. Path revealed positive for malignant cells, carcinoma, favor high grade with marked apoptosis. Immunostains: Positive for P40, CD5, [id number] and show an elevated\par Ki67 index. 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 Joe Barraza for chemotherapy, completed 8 cycles of chemo, last dose 9/20/22. She has completed neoadjuvant RT with Dr. Conner Walker in 9/2022 at [facility]. \par \par CT C/A/P w/ contrast on 10/5/22:\par - decreased in size 3.2 x 2.1 x 3.9cm anterior mediastinal mass abutting the pericardium (2:59; previously 4.2 x 2.6 x 5.6cm)\par - stable 1cm Rt hilar LN\par - few subcentimeter mediastinal LNs\par - 5mm RLL nodule (3:101); small 4mm DOUG nodule (3:74); small 4mm RML nodule (3:92)\par - small 3mm calcified granuloma in the RUL\par \par PFTs on 10/15/22: FVC 98%, FEV1 96%, DLCO 77%.\par \par PET/CT on 10/27/22 @ [facility]:\par - decreased in size and activity of the anterior mediastinal mass, measuring 3.2 x 2.1cm SUV=3 now (image 63), previously 5.2 x 3.2cm SUV=12.7, contact the Rt pleural space as well as the anterior pericardium w/ slight mild mass effect on the Rt atrium\par - decreased activity w/in the adjacent sternum, now w/ SUV=3.6 (image 64), previously SUV=6.7\par - bilateral hilar LNs measuring up to 1cm in the Rt with SUV=5 (image 57; previously SUV=5.1)\par - several lung nodules bilaterally, measuring up to 5mm in the RML (image 65); 5mm in the RLL (image 66)\par - a new 6mm RUL ggo (image 51)\par \par Now s/p Flex Bronch, EBUS Bx on 11/29/22. Path of Lvl 7 LN negative for malignancy; R4 LN non diagnostic; R11 LN non diagnostic.\par \par Instructed patient to consult with Dr. Jj Roger for possible joint case: Resection of anterior mediastinal mass, rxn of sternum and reconstruction. She saw Dr. Roger on 12/7/2022. \par \par Now 2wk s/p sternectomy, En bloc resection of partial right second, third, and fourth ribs, Radical excision of thymic carcinoma with total thymectomy, En bloc wedge resection of the portion of right upper lobe and right middle lobe of the lung, Subtotal pericardectomy, Reconstruction using composite mesh and methyl methacrylate on 1/3/23. Path revealed residual thymic carcinoma, squamous cell type, multiple separate microscopic foci, the largest 5 mm, all margins are negative, [id number]\par \par CXR today---- small Rt sided pleural effusion.\par \par I have reviewed the patient's medical records and diagnostic images at time of this office consultation and have made the following recommendation:\par 1. Path reviewed and explained to patient, recommended patient to f/u with Dr. Barraza for consultation, RTC via TEB with CT Chest w/o contrast, if persistent pleural effusion, then IR Thoracentesis.\par \par \par I, Dr. COFFMAN, TAPAN CASTILLO, personally performed the evaluation and management (E/M) services for this established patient who presents today with (a) new problem(s)/exacerbation of (an) existing condition(s). That E/M includes conducting the examination, assessing all new/exacerbated conditions, and establishing a new plan of care. Today, my ACP, Hannah Villareal NP was here to observe my evaluation and management services for this new problem/exacerbated condition to be followed going forward.\par \par \par \par

## 2023-01-23 NOTE — CONSULT LETTER
[FreeTextEntry2] : Dr. Kb Solano (Pulm/Ref)\par Dr. Haley Richard (PCP) [FreeTextEntry3] : Chandler Mcqueen MD, MPH \par System Director of Thoracic Surgery \par Director of Comprehensive [facility] [place] \par Professor Cardiovascular & Thoracic Surgery \par [employer] at [facility]\par \par

## 2023-01-25 ENCOUNTER — RESULT REVIEW (OUTPATIENT)
Age: 63
End: 2023-01-25

## 2023-01-25 ENCOUNTER — INPATIENT (INPATIENT)
Facility: HOSPITAL | Age: 63
LOS: 2 days | Discharge: HOME | End: 2023-01-28
Attending: THORACIC SURGERY (CARDIOTHORACIC VASCULAR SURGERY) | Admitting: THORACIC SURGERY (CARDIOTHORACIC VASCULAR SURGERY)
Payer: MEDICAID

## 2023-01-25 VITALS
OXYGEN SATURATION: 98 % | SYSTOLIC BLOOD PRESSURE: 139 MMHG | TEMPERATURE: 98 F | RESPIRATION RATE: 18 BRPM | DIASTOLIC BLOOD PRESSURE: 76 MMHG | HEIGHT: 63 IN | HEART RATE: 95 BPM

## 2023-01-25 DIAGNOSIS — Z98.890 OTHER SPECIFIED POSTPROCEDURAL STATES: Chronic | ICD-10-CM

## 2023-01-25 DIAGNOSIS — J90 PLEURAL EFFUSION, NOT ELSEWHERE CLASSIFIED: ICD-10-CM

## 2023-01-25 LAB
ALBUMIN FLD-MCNC: 2.9 G/DL — SIGNIFICANT CHANGE UP
ALBUMIN SERPL ELPH-MCNC: 3.7 G/DL — SIGNIFICANT CHANGE UP (ref 3.3–5)
ALP SERPL-CCNC: 75 U/L — SIGNIFICANT CHANGE UP (ref 40–120)
ALT FLD-CCNC: 12 U/L — SIGNIFICANT CHANGE UP (ref 4–33)
ANION GAP SERPL CALC-SCNC: 10 MMOL/L — SIGNIFICANT CHANGE UP (ref 7–14)
APTT BLD: 28.2 SEC — SIGNIFICANT CHANGE UP (ref 27–36.3)
AST SERPL-CCNC: 35 U/L — HIGH (ref 4–32)
B PERT IGG+IGM PNL SER: ABNORMAL
BASOPHILS # BLD AUTO: 0.03 K/UL — SIGNIFICANT CHANGE UP (ref 0–0.2)
BASOPHILS NFR BLD AUTO: 0.6 % — SIGNIFICANT CHANGE UP (ref 0–2)
BILIRUB SERPL-MCNC: 0.3 MG/DL — SIGNIFICANT CHANGE UP (ref 0.2–1.2)
BLD GP AB SCN SERPL QL: NEGATIVE — SIGNIFICANT CHANGE UP
BUN SERPL-MCNC: 14 MG/DL — SIGNIFICANT CHANGE UP (ref 7–23)
CALCIUM SERPL-MCNC: 9.1 MG/DL — SIGNIFICANT CHANGE UP (ref 8.4–10.5)
CHLORIDE SERPL-SCNC: 102 MMOL/L — SIGNIFICANT CHANGE UP (ref 98–107)
CO2 SERPL-SCNC: 26 MMOL/L — SIGNIFICANT CHANGE UP (ref 22–31)
COLOR FLD: SIGNIFICANT CHANGE UP
CREAT SERPL-MCNC: 0.52 MG/DL — SIGNIFICANT CHANGE UP (ref 0.5–1.3)
EGFR: 104 ML/MIN/1.73M2 — SIGNIFICANT CHANGE UP
EOSINOPHIL # BLD AUTO: 0.21 K/UL — SIGNIFICANT CHANGE UP (ref 0–0.5)
EOSINOPHIL # FLD: 1 % — SIGNIFICANT CHANGE UP
EOSINOPHIL NFR BLD AUTO: 4.5 % — SIGNIFICANT CHANGE UP (ref 0–6)
FLUAV AG NPH QL: SIGNIFICANT CHANGE UP
FLUBV AG NPH QL: SIGNIFICANT CHANGE UP
FLUID INTAKE SUBSTANCE CLASS: SIGNIFICANT CHANGE UP
GLUCOSE FLD-MCNC: 103 MG/DL — SIGNIFICANT CHANGE UP
GLUCOSE SERPL-MCNC: 110 MG/DL — HIGH (ref 70–99)
HCT VFR BLD CALC: 34.4 % — LOW (ref 34.5–45)
HGB BLD-MCNC: 10.8 G/DL — LOW (ref 11.5–15.5)
IANC: 3.09 K/UL — SIGNIFICANT CHANGE UP (ref 1.8–7.4)
IMM GRANULOCYTES NFR BLD AUTO: 0.4 % — SIGNIFICANT CHANGE UP (ref 0–0.9)
INR BLD: 1.09 RATIO — SIGNIFICANT CHANGE UP (ref 0.88–1.16)
LDH SERPL L TO P-CCNC: 320 U/L — SIGNIFICANT CHANGE UP
LYMPHOCYTES # BLD AUTO: 0.67 K/UL — LOW (ref 1–3.3)
LYMPHOCYTES # BLD AUTO: 14.5 % — SIGNIFICANT CHANGE UP (ref 13–44)
LYMPHOCYTES # FLD: 70 % — SIGNIFICANT CHANGE UP
MCHC RBC-ENTMCNC: 30.9 PG — SIGNIFICANT CHANGE UP (ref 27–34)
MCHC RBC-ENTMCNC: 31.4 GM/DL — LOW (ref 32–36)
MCV RBC AUTO: 98.3 FL — SIGNIFICANT CHANGE UP (ref 80–100)
MONOCYTES # BLD AUTO: 0.61 K/UL — SIGNIFICANT CHANGE UP (ref 0–0.9)
MONOCYTES NFR BLD AUTO: 13.2 % — SIGNIFICANT CHANGE UP (ref 2–14)
MONOS+MACROS # FLD: 18 % — SIGNIFICANT CHANGE UP
NEUTROPHILS # BLD AUTO: 3.09 K/UL — SIGNIFICANT CHANGE UP (ref 1.8–7.4)
NEUTROPHILS NFR BLD AUTO: 66.8 % — SIGNIFICANT CHANGE UP (ref 43–77)
NEUTROPHILS-BODY FLUID: 11 % — SIGNIFICANT CHANGE UP
NRBC # BLD: 0 /100 WBCS — SIGNIFICANT CHANGE UP (ref 0–0)
NRBC # FLD: 0 K/UL — SIGNIFICANT CHANGE UP (ref 0–0)
PLATELET # BLD AUTO: 330 K/UL — SIGNIFICANT CHANGE UP (ref 150–400)
POTASSIUM SERPL-MCNC: 4.6 MMOL/L — SIGNIFICANT CHANGE UP (ref 3.5–5.3)
POTASSIUM SERPL-SCNC: 4.6 MMOL/L — SIGNIFICANT CHANGE UP (ref 3.5–5.3)
PROT FLD-MCNC: 5 G/DL — SIGNIFICANT CHANGE UP
PROT SERPL-MCNC: 7.4 G/DL — SIGNIFICANT CHANGE UP (ref 6–8.3)
PROTHROM AB SERPL-ACNC: 12.7 SEC — SIGNIFICANT CHANGE UP (ref 10.5–13.4)
RBC # BLD: 3.5 M/UL — LOW (ref 3.8–5.2)
RBC # FLD: 11.6 % — SIGNIFICANT CHANGE UP (ref 10.3–14.5)
RCV VOL RI: HIGH CELLS/UL (ref 0–5)
RH IG SCN BLD-IMP: POSITIVE — SIGNIFICANT CHANGE UP
RSV RNA NPH QL NAA+NON-PROBE: SIGNIFICANT CHANGE UP
SARS-COV-2 RNA SPEC QL NAA+PROBE: SIGNIFICANT CHANGE UP
SODIUM SERPL-SCNC: 138 MMOL/L — SIGNIFICANT CHANGE UP (ref 135–145)
TOTAL NUCLEATED CELL COUNT, BODY FLUID: 602 CELLS/UL — HIGH (ref 0–5)
TUBE TYPE: SIGNIFICANT CHANGE UP
WBC # BLD: 4.63 K/UL — SIGNIFICANT CHANGE UP (ref 3.8–10.5)
WBC # FLD AUTO: 4.63 K/UL — SIGNIFICANT CHANGE UP (ref 3.8–10.5)

## 2023-01-25 PROCEDURE — 32557 INSERT CATH PLEURA W/ IMAGE: CPT

## 2023-01-25 PROCEDURE — 88112 CYTOPATH CELL ENHANCE TECH: CPT | Mod: 26

## 2023-01-25 PROCEDURE — 99285 EMERGENCY DEPT VISIT HI MDM: CPT

## 2023-01-25 PROCEDURE — 88305 TISSUE EXAM BY PATHOLOGIST: CPT | Mod: 26

## 2023-01-25 PROCEDURE — 71045 X-RAY EXAM CHEST 1 VIEW: CPT | Mod: 26,76

## 2023-01-25 RX ORDER — FAMOTIDINE 10 MG/ML
20 INJECTION INTRAVENOUS DAILY
Refills: 0 | Status: DISCONTINUED | OUTPATIENT
Start: 2023-01-25 | End: 2023-01-28

## 2023-01-25 RX ORDER — ACETAMINOPHEN 500 MG
650 TABLET ORAL EVERY 6 HOURS
Refills: 0 | Status: DISCONTINUED | OUTPATIENT
Start: 2023-01-25 | End: 2023-01-28

## 2023-01-25 RX ORDER — HEPARIN SODIUM 5000 [USP'U]/ML
5000 INJECTION INTRAVENOUS; SUBCUTANEOUS EVERY 8 HOURS
Refills: 0 | Status: DISCONTINUED | OUTPATIENT
Start: 2023-01-25 | End: 2023-01-28

## 2023-01-25 RX ORDER — OXYCODONE HYDROCHLORIDE 5 MG/1
5 TABLET ORAL
Refills: 0 | Status: DISCONTINUED | OUTPATIENT
Start: 2023-01-25 | End: 2023-01-26

## 2023-01-25 RX ADMIN — Medication 650 MILLIGRAM(S): at 16:36

## 2023-01-25 RX ADMIN — OXYCODONE HYDROCHLORIDE 5 MILLIGRAM(S): 5 TABLET ORAL at 15:05

## 2023-01-25 RX ADMIN — HEPARIN SODIUM 5000 UNIT(S): 5000 INJECTION INTRAVENOUS; SUBCUTANEOUS at 23:05

## 2023-01-25 RX ADMIN — OXYCODONE HYDROCHLORIDE 5 MILLIGRAM(S): 5 TABLET ORAL at 16:36

## 2023-01-25 RX ADMIN — FAMOTIDINE 20 MILLIGRAM(S): 10 INJECTION INTRAVENOUS at 15:04

## 2023-01-25 RX ADMIN — Medication 650 MILLIGRAM(S): at 15:04

## 2023-01-25 NOTE — ED ADULT TRIAGE NOTE - CHIEF COMPLAINT QUOTE
Pt s/p midsternal tumor resection surgery on 1/3, had an outpatient CT done which showed fluid in the lungs. Pt c/o SOB and chest pain. Pt finished chemo and radiation in September.

## 2023-01-25 NOTE — H&P ADULT - ASSESSMENT
Assessment: 63yo F s/p thymic mass resection and bilateral pec flap advancement on 1/3/23 who presents complaining of shortness of breath and cough for five days, now found on CT Chest to have large right pleural effusion.    Plan  - Admit to Thoracic Surgery under Dr. Mcqueen  - Bedside PTC placment  - F/u pleural fluid studies  - Pain control  - Daily CXR    Plan discussed with and in agreement with Dr. Mcqueen    Thoracic Surgery  [id number]

## 2023-01-25 NOTE — H&P ADULT - HISTORY OF PRESENT ILLNESS
Ms. Mack Oro, 62 year old female, never a smoker, with w/ PMHx anterior mediastinal mass presents complaining of shortness of breath. Pt is s/p FNA of anterior mediastinal mass on 7/8/22. Path revealed positive for malignant cells, consistent with a Thymic Squamous Cell Carcinoma. Pt has completed 8 cycles of chemo, last dose 9/20/22. Now s/p sternectomy, En bloc resection of partial right second, third, and fourth ribs, Radical excision of thymic carcinoma with total thymectomy, En bloc wedge resection of the portion of right upper lobe and right middle lobe of the lung, Subtotal pericardectomy, Reconstruction using composite mesh and methyl methacrylate on 1/3/23. Pt states that five days ago she began experiencing SOB with right sided chest pain. Pt states that she has infrequently been coughing up small amounts of white sticky/frothy sputum. Pt states that when speaking, she would at times begin sporadically coughing or feel the need to catch her breath. Patient states that she became unable to sleep at night due to being awaken roughly every two hours with the sensation of being unable to breathe. Pt also noted that two days ago her sternal incision became increasingly sensitive to touch with mild redness around the site. Pt denies prior occurrences of symptoms. Pt denied any fevers or hemoptysis. On 1/24 patient got [facility] chest which showed large right pleural effusion of the right lower lobe.     In ED patient's vitals were /76, HR 95, Temp 98.3, satting 98 RA. Ms. Mack Oro, 62 year old female, never a smoker, with w/ PMHx anterior mediastinal mass presents complaining of shortness of breath. Pt is s/p FNA of anterior mediastinal mass on 7/8/22. Path revealed positive for malignant cells, consistent with a Thymic Squamous Cell Carcinoma. Pt has completed 8 cycles of chemo, last dose 9/20/22. Now s/p sternectomy, En bloc resection of partial right second, third, and fourth ribs, Radical excision of thymic carcinoma with total thymectomy, En bloc wedge resection of the portion of right upper lobe and right middle lobe of the lung, Subtotal pericardectomy, Reconstruction using composite mesh and methyl methacrylate on 1/3/23. Pt states that five days ago she began experiencing SOB with right sided chest pain. Pt states that she has infrequently been coughing up small amounts of white sticky/frothy sputum. Pt states that when speaking, she would at times begin sporadically coughing or feel the need to catch her breath. Patient states that she became unable to sleep at night due to being awaken roughly every two hours with the sensation of being unable to breathe. Pt also noted that two days ago her sternal incision became increasingly sensitive to touch with mild redness around the site. Pt denies prior occurrences of symptoms. Pt denied any fevers or hemoptysis. On 1/24 patient got CT chest via [address] Radiology chest which showed large right pleural effusion of the right lower lobe.     In ED patient's vitals were /76, HR 95, Temp 98.3, satting 98 RA.

## 2023-01-25 NOTE — ED PROVIDER NOTE - PHYSICAL EXAMINATION
Yes Vital signs reviewed. O2 sat 100% RA   CONSTITUTIONAL: Well-appearing; well-nourished; in no apparent distress. Non-toxic appearing.   HEAD: Normocephalic, atraumatic.  EYES: PERRL, EOM intact, conjunctiva and sclera WNL.  CARD: Normal S1, S2; no murmurs, rubs, or gallops noted.  RESP: Normal chest excursion with respiration; breath sounds decreased right mid/ lower lung fields.   ABD/GI: soft, non-distended; non-tender; no palpable organomegaly, no pulsatile mass.  EXT/MS: moves all extremities; distal pulses are normal, no pedal edema., ambulating to the bathroom without difficulty.   SKIN: Normal for age and race; warm; dry; good turgor; no apparent lesions or exudate noted.  NEURO: Awake, alert, oriented x 3, no gross deficits, CN II-XII grossly intact, no motor or sensory deficit noted.

## 2023-01-25 NOTE — ED PROVIDER NOTE - ATTENDING APP SHARED VISIT CONTRIBUTION OF CARE
I have evaluated the patient and agree with the documentation and assessment as made by the MAE. We have discussed plan of care and work up for the patient.   This was a shared visit with the MAE. I reviewed and verified the documentation and independently performed the documented:   History, Exam and Medical Decision Making.    Patient Mandarin speaking, daughter in law at bedside providing translation    62F, thymic CA s/p chemo/radiation, prior sternectomy, who is sent to the ED for admission. For the past one week, reports worsening shortness of breath. The evening prior, obtained an outpatient CT that demonstrated the following:  "showing large right pleural effusion with compression atelectasis of the right lower lobe, probable partial collapse of the right middle lobe.  Postoperative changes to the anterior mediastinum with fluid now present in the anterior mediastinum and surgical material when compared to PET/CT of 10/27/2022.  Suggestion of nodule soft tissue along the medial right upper lung pleural surface along the suture material in recurrent or residual disease is not excluded.  At least 2 left hepatic masses measuring 4.3 cm and 2.7 cm new from prior exam concerning for metastatic disease." No headaches, fevers, chills, cough, belly pain nvd. Physical: afebrile, non-toxic appearing, not hypoxic, decreased breath sounds on the R side, abdomen soft. Plan: labs, admit.

## 2023-01-25 NOTE — ED PROVIDER NOTE - OBJECTIVE STATEMENT
Patient Mandarin speaking, daughter in law at bedside providing translation  61 yo F with PMHX Thymic cancer treated with chemo & RT which were completed 9/2022 underwent a thymectomy with partial sternectomy and resection of anterior right ribs 2-4 and wedge resection of right middle lobe on 1/3/23 presents to  ER for SOB, outpatient CT showing Large right pleural effusion with compression atelectasis of the right lower lobe, probable partial collapse of the right middle lobe.  Postoperative changes to the anterior mediastinum with fluid now present in the anterior mediastinum and surgical material when compared to PET/CT of 10/27/2022.  Suggestion of nodule soft tissue along the medial right upper lung pleural surface along the suture material in recurrent or residual disease is not excluded.  At least 2 left hepatic masses measuring 4.3 cm and 2.7 cm new from prior exam concerning for metastatic disease.  Patient is known to CT surgery discussed with EDGAR Huff patient to be admitted to Dr. Chandler Mcqueen.  Requesting preop labs. plan for pigtail.  patient reporting chest pain on right side, sob, with intermittent episodes at night of increased difficulty breathing while laying flat, and back pain.   Denies fever, chills, nausea, vomiting, abdominal pain, decreased PO intake, leg swelling, urinary symptoms, numbness, tingling.

## 2023-01-25 NOTE — ED PROVIDER NOTE - CLINICAL SUMMARY MEDICAL DECISION MAKING FREE TEXT BOX
63 yo F with PMHX Thymic cancer treated with chemo & RT which were completed 9/2022 underwent a thymectomy with partial sternectomy and resection of anterior right ribs 2-4 and wedge resection of right middle lobe on 1/3/23 presents to  ER for SOB, outpatient CT showing Large right pleural effusion with compression atelectasis of the right lower lobe, probable partial collapse of the right middle lobe.  Postoperative changes to the anterior mediastinum with fluid now present in the anterior mediastinum and surgical material when compared to PET/CT of 10/27/2022.  Suggestion of nodule soft tissue along the medial right upper lung pleural surface along the suture material in recurrent or residual disease is not excluded.  At least 2 left hepatic masses measuring 4.3 cm and 2.7 cm new from prior exam concerning for metastatic disease.  Patient is known to CT surgery discussed with EDGAR Huff patient to be admitted to Dr. Chandler Mcqueen.  Requesting preop labs. plan for pigtail.

## 2023-01-25 NOTE — ED ADULT NURSE REASSESSMENT NOTE - NS ED NURSE REASSESS COMMENT FT1
Chest tube drainage noted at 850 mL. Pt states relief since receiving the chest tube. Pain at chest drainage site is 7/10. Chest tube drainage noted at 850 mL. Pt states relief since receiving the chest tube. Pain at chest drainage site is 7/10. Pt breath sounds are clear bilaterally on auscultation.

## 2023-01-25 NOTE — ED ADULT NURSE REASSESSMENT NOTE - NS ED NURSE REASSESS COMMENT FT1
Pt resting comfortably in bed, denies complaints. Pt pending admission bed assignment. Will continue to monitor.

## 2023-01-25 NOTE — ED ADULT NURSE REASSESSMENT NOTE - NS ED NURSE REASSESS COMMENT FT1
Pt remains in stretcher; awake, alert, responsive to all stimuli. Pt to chest tube remains intact; about 900cc of drainage noted. Cardiac monitoring ongoing. Vital signs stable. Breathing room air comfortably. Pt pending transfer to floor. Safety maintained. No acute distress noted.

## 2023-01-25 NOTE — ED ADULT NURSE NOTE - OBJECTIVE STATEMENT
Pt is Mandrin speaking, with daughter in-law at bed side to translate as requested by pt. Pt s/p midsternal tumor resection surgery on 1/3 for thymus cancer, had an outpatient CT done which showed fluid in the lungs. Pt c/o SOB and chest pain. Pt finished chemo and radiation in September. Ronchi breath sounds on expiration auscultated. Pt is complaining of SOB with chest pain and back pain. Pt SpO2 is at 99 room air. RR are labored. MD made aware of the patients condition. Pt complaining of redness around her surgical incision. Skin is intact, and no discharge excreted form the surgical incision. No edema noted. Pulses equal and strong in all extremities. Will continue to monitor, and draw labs.

## 2023-01-26 ENCOUNTER — TRANSCRIPTION ENCOUNTER (OUTPATIENT)
Age: 63
End: 2023-01-26

## 2023-01-26 ENCOUNTER — APPOINTMENT (OUTPATIENT)
Dept: THORACIC SURGERY | Facility: CLINIC | Age: 63
End: 2023-01-26

## 2023-01-26 LAB
ANION GAP SERPL CALC-SCNC: 11 MMOL/L — SIGNIFICANT CHANGE UP (ref 7–14)
BUN SERPL-MCNC: 10 MG/DL — SIGNIFICANT CHANGE UP (ref 7–23)
CALCIUM SERPL-MCNC: 9.1 MG/DL — SIGNIFICANT CHANGE UP (ref 8.4–10.5)
CHLORIDE SERPL-SCNC: 100 MMOL/L — SIGNIFICANT CHANGE UP (ref 98–107)
CO2 SERPL-SCNC: 26 MMOL/L — SIGNIFICANT CHANGE UP (ref 22–31)
CREAT SERPL-MCNC: 0.52 MG/DL — SIGNIFICANT CHANGE UP (ref 0.5–1.3)
EGFR: 104 ML/MIN/1.73M2 — SIGNIFICANT CHANGE UP
GLUCOSE SERPL-MCNC: 108 MG/DL — HIGH (ref 70–99)
GRAM STN FLD: SIGNIFICANT CHANGE UP
HCT VFR BLD CALC: 35.7 % — SIGNIFICANT CHANGE UP (ref 34.5–45)
HGB BLD-MCNC: 11.7 G/DL — SIGNIFICANT CHANGE UP (ref 11.5–15.5)
MAGNESIUM SERPL-MCNC: 2.3 MG/DL — SIGNIFICANT CHANGE UP (ref 1.6–2.6)
MCHC RBC-ENTMCNC: 31.4 PG — SIGNIFICANT CHANGE UP (ref 27–34)
MCHC RBC-ENTMCNC: 32.8 GM/DL — SIGNIFICANT CHANGE UP (ref 32–36)
MCV RBC AUTO: 95.7 FL — SIGNIFICANT CHANGE UP (ref 80–100)
NRBC # BLD: 0 /100 WBCS — SIGNIFICANT CHANGE UP (ref 0–0)
NRBC # FLD: 0 K/UL — SIGNIFICANT CHANGE UP (ref 0–0)
PH FLD: 8.2 — SIGNIFICANT CHANGE UP
PHOSPHATE SERPL-MCNC: 3.9 MG/DL — SIGNIFICANT CHANGE UP (ref 2.5–4.5)
PLATELET # BLD AUTO: 340 K/UL — SIGNIFICANT CHANGE UP (ref 150–400)
POTASSIUM SERPL-MCNC: 3.9 MMOL/L — SIGNIFICANT CHANGE UP (ref 3.5–5.3)
POTASSIUM SERPL-SCNC: 3.9 MMOL/L — SIGNIFICANT CHANGE UP (ref 3.5–5.3)
RBC # BLD: 3.73 M/UL — LOW (ref 3.8–5.2)
RBC # FLD: 11.4 % — SIGNIFICANT CHANGE UP (ref 10.3–14.5)
SODIUM SERPL-SCNC: 137 MMOL/L — SIGNIFICANT CHANGE UP (ref 135–145)
SPECIMEN SOURCE: SIGNIFICANT CHANGE UP
WBC # BLD: 5.19 K/UL — SIGNIFICANT CHANGE UP (ref 3.8–10.5)
WBC # FLD AUTO: 5.19 K/UL — SIGNIFICANT CHANGE UP (ref 3.8–10.5)

## 2023-01-26 PROCEDURE — 71045 X-RAY EXAM CHEST 1 VIEW: CPT | Mod: 26

## 2023-01-26 RX ORDER — SENNA PLUS 8.6 MG/1
2 TABLET ORAL AT BEDTIME
Refills: 0 | Status: DISCONTINUED | OUTPATIENT
Start: 2023-01-26 | End: 2023-01-28

## 2023-01-26 RX ORDER — OXYCODONE HYDROCHLORIDE 5 MG/1
7.5 TABLET ORAL
Refills: 0 | Status: DISCONTINUED | OUTPATIENT
Start: 2023-01-26 | End: 2023-01-28

## 2023-01-26 RX ORDER — INFLUENZA VIRUS VACCINE 15; 15; 15; 15 UG/.5ML; UG/.5ML; UG/.5ML; UG/.5ML
0.5 SUSPENSION INTRAMUSCULAR ONCE
Refills: 0 | Status: DISCONTINUED | OUTPATIENT
Start: 2023-01-26 | End: 2023-01-26

## 2023-01-26 RX ORDER — SODIUM CHLORIDE 9 MG/ML
1000 INJECTION, SOLUTION INTRAVENOUS
Refills: 0 | Status: DISCONTINUED | OUTPATIENT
Start: 2023-01-26 | End: 2023-01-27

## 2023-01-26 RX ORDER — OXYCODONE HYDROCHLORIDE 5 MG/1
5 TABLET ORAL
Refills: 0 | Status: DISCONTINUED | OUTPATIENT
Start: 2023-01-26 | End: 2023-01-28

## 2023-01-26 RX ORDER — POLYETHYLENE GLYCOL 3350 17 G/17G
17 POWDER, FOR SOLUTION ORAL DAILY
Refills: 0 | Status: DISCONTINUED | OUTPATIENT
Start: 2023-01-26 | End: 2023-01-28

## 2023-01-26 RX ADMIN — OXYCODONE HYDROCHLORIDE 5 MILLIGRAM(S): 5 TABLET ORAL at 14:25

## 2023-01-26 RX ADMIN — OXYCODONE HYDROCHLORIDE 5 MILLIGRAM(S): 5 TABLET ORAL at 06:00

## 2023-01-26 RX ADMIN — FAMOTIDINE 20 MILLIGRAM(S): 10 INJECTION INTRAVENOUS at 14:11

## 2023-01-26 RX ADMIN — POLYETHYLENE GLYCOL 3350 17 GRAM(S): 17 POWDER, FOR SOLUTION ORAL at 18:03

## 2023-01-26 RX ADMIN — OXYCODONE HYDROCHLORIDE 5 MILLIGRAM(S): 5 TABLET ORAL at 14:10

## 2023-01-26 RX ADMIN — SODIUM CHLORIDE 100 MILLILITER(S): 9 INJECTION, SOLUTION INTRAVENOUS at 11:05

## 2023-01-26 RX ADMIN — HEPARIN SODIUM 5000 UNIT(S): 5000 INJECTION INTRAVENOUS; SUBCUTANEOUS at 14:11

## 2023-01-26 RX ADMIN — OXYCODONE HYDROCHLORIDE 5 MILLIGRAM(S): 5 TABLET ORAL at 05:06

## 2023-01-26 RX ADMIN — HEPARIN SODIUM 5000 UNIT(S): 5000 INJECTION INTRAVENOUS; SUBCUTANEOUS at 05:07

## 2023-01-26 NOTE — DISCHARGE NOTE PROVIDER - NSDCFUADDAPPT_GEN_ALL_CORE_FT
See Dr Mcqueen in 2 weeks- call for an appointment and bring a new chest X-ray with you when you come.   See Dr Mcqueen in 2 weeks- call for an appointment and bring a new chest X-ray with you when you come.      Please go to any radiology imaging place near you and obtain an MRI of abdomen. Script has been provided to you.

## 2023-01-26 NOTE — DISCHARGE NOTE PROVIDER - NSDCACTIVITY_GEN_ALL_CORE
No restrictions/Do not drive or operate machinery/Showering allowed/Do not make important decisions/Stairs allowed/Walking - Indoors allowed/No heavy lifting/straining/Walking - Outdoors allowed

## 2023-01-26 NOTE — DISCHARGE NOTE PROVIDER - CARE PROVIDER_API CALL
Chandler Mcqueen (MD)  Surgery; Thoracic Surgery  [phone] [place] [address] [place] [address]  Phone: [phone]  Fax: [phone]  Established Patient  Follow Up Time: 2 weeks

## 2023-01-26 NOTE — DISCHARGE NOTE PROVIDER - NSDCMRMEDTOKEN_GEN_ALL_CORE_FT
Dulcolax Laxative 10 mg rectal suppository: 1 suppository(ies) rectal once a day, As Needed  ibuprofen 200 mg oral tablet: 2 tab(s) orally every 6 hours, As Needed  Alternate with Tylenol  oxyCODONE 5 mg oral tablet: 1 tab(s) orally every 4 hours, As Needed -Moderate-Severe Pain (4 - 10) MDD:6  polyethylene glycol 3350 oral powder for reconstitution: 17 gram(s) orally once a day  senna leaf extract oral tablet: 2 tab(s) orally once a day (at bedtime)  Tylenol 325 mg oral tablet: 2 tab(s) orally every 6 hours, As Needed. Alternate with Advil and continue to take with Oxy  as needed   Dulcolax Laxative 10 mg rectal suppository: 1 suppository(ies) rectal once a day, As Needed  ibuprofen 200 mg oral tablet: 2 tab(s) orally every 6 hours, As Needed  Alternate with Tylenol  Imaging: MRI of abdomen with and without IV contrast    Hx of Thymic cancer   Abnoramality of liver seen on recent CT scan  Imaging: CXR PA/LAT  s/p Right pigtail catheter  ICD10: J93.9  polyethylene glycol 3350 oral powder for reconstitution: 17 gram(s) orally once a day  senna leaf extract oral tablet: 2 tab(s) orally once a day (at bedtime)  Tylenol 325 mg oral tablet: 2 tab(s) orally every 6 hours, As Needed. Alternate with Advil and continue to take with Oxy  as needed

## 2023-01-26 NOTE — DISCHARGE NOTE PROVIDER - NSDCCPTREATMENT_GEN_ALL_CORE_FT
PRINCIPAL PROCEDURE  Procedure: Insertion, pigtail catheter, pleural  Findings and Treatment:

## 2023-01-26 NOTE — DISCHARGE NOTE PROVIDER - NSDCFUADDINST_GEN_ALL_CORE_FT
Keep the chest tube dressings in place for two days and then remove them so that you can shower.  Wash with soap and water and leave the chest tube site open to air to dry.  Some drainage is normal but watch for pus, increased redness, fevers, or difficulty breathing and if noted, call Dr Mcqueen.

## 2023-01-26 NOTE — DISCHARGE NOTE PROVIDER - NSDCCPCAREPLAN_GEN_ALL_CORE_FT
PRINCIPAL DISCHARGE DIAGNOSIS  Diagnosis: Large pleural effusion  Assessment and Plan of Treatment:       SECONDARY DISCHARGE DIAGNOSES  Diagnosis: SOB (shortness of breath)  Assessment and Plan of Treatment:

## 2023-01-26 NOTE — PROGRESS NOTE ADULT - SUBJECTIVE AND OBJECTIVE BOX
Subjective: Translation done with DR. Mcqueen at bedside. Pt states feeling weak, dizzy and dehydrated at times. States poor appetite lately. Also c/o pain at PTC site. No CP or SOB. Off oxygen.     Vital Signs:  Vital Signs Last 24 Hrs  T(C): 36.6 (01-26-23 @ 11:47), Max: 37.2 (01-26-23 @ 01:15)  T(F): 97.9 (01-26-23 @ 11:47), Max: 98.9 (01-26-23 @ 01:15)  HR: 95 (01-26-23 @ 11:47) (90 - 97)  BP: 131/75 (01-26-23 @ 11:47) (121/84 - 139/93)  RR: 19 (01-26-23 @ 11:47) (18 - 20)  SpO2: 96% (01-26-23 @ 11:47) (96% - 99%) on (O2)    Telemetry/Alarms:  General: WN/WD NAD  Neurology: Awake, nonfocal, AREVALO x 4  Eyes: Scleras clear, PERRLA/ EOMI, Gross vision intact  ENT:Gross hearing intact, grossly patent pharynx, no stridor  Neck: Neck supple, trachea midline, No JVD,   Respiratory: CTA B/L, No wheezing, rales, rhonchi. Dec'd BS at apex  CV: RRR, S1S2, no murmurs, rubs or gallops  Abdominal: Soft, NT, ND +BS, no BM  Extremities: No edema, + peripheral pulses  Skin: No Rashes, Hematoma, Ecchymosis  Lymphatic: No Neck, axilla, groin LAD  Psych: Oriented x 3, normal affect  Incisions: Sternotomy healing, c/d/i, stable.   Tubes: rt PTC with total of about 900cc since insertion yesterday. On suciton, no air leak. Serosang fluid.   Relevant labs, radiology and Medications reviewed           CXR- sml apical ptx, improved effusion.              11.7   5.19  )-----------( 340      ( 26 Jan 2023 06:55 )             35.7     01-26    137  |  100  |  10  ----------------------------<  108<H>  3.9   |  26  |  0.52    Ca    9.1      26 Jan 2023 06:55  Phos  3.9     01-26  Mg     2.30     01-26    TPro  7.4  /  Alb  3.7  /  TBili  0.3  /  DBili  x   /  AST  35<H>  /  ALT  12  /  AlkPhos  75  01-25    PT/INR - ( 25 Jan 2023 13:36 )   PT: 12.7 sec;   INR: 1.09 ratio         PTT - ( 25 Jan 2023 13:36 )  PTT:28.2 sec  MEDICATIONS  (STANDING):  famotidine    Tablet 20 milliGRAM(s) Oral daily  heparin   Injectable 5000 Unit(s) SubCutaneous every 8 hours  lactated ringers. 1000 milliLiter(s) (100 mL/Hr) IV Continuous <Continuous>    MEDICATIONS  (PRN):  acetaminophen     Tablet .. 650 milliGRAM(s) Oral every 6 hours PRN Temp greater or equal to 38C (100.4F), Mild Pain (1 - 3)  oxyCODONE    IR 5 milliGRAM(s) Oral every 3 hours PRN Moderate Pain (4 - 6)  oxyCODONE    IR 7.5 milliGRAM(s) Oral every 3 hours PRN Severe Pain (7 - 10)    Pertinent Physical Exam  I&O's Summary    25 Jan 2023 07:01  -  26 Jan 2023 07:00  --------------------------------------------------------  IN: 270 mL / OUT: 400 mL / NET: -130 mL          Assessment  62y Female  w/ PAST MEDICAL & SURGICAL HISTORY:  Mediastinal mass      Thymic cancer      S/P appendectomy  2003      History of lung biopsy    Ms. Mack Oro, 62 year old female, never a smoker, with w/ PMHx anterior mediastinal mass presents complaining of shortness of breath. Pt is s/p FNA of anterior mediastinal mass on 7/8/22. Path revealed positive for malignant cells, consistent with a Thymic Squamous Cell Carcinoma. Pt has completed 8 cycles of chemo, last dose 9/20/22. Now s/p sternectomy, En bloc resection of partial right second, third, and fourth ribs, Radical excision of thymic carcinoma with total thymectomy, En bloc wedge resection of the portion of right upper lobe and right middle lobe of the lung, Subtotal pericardectomy, Reconstruction using composite mesh and methyl methacrylate on 1/3/23. Pt states that five days ago she began experiencing SOB with right sided chest pain. Pt states that she has infrequently been coughing up small amounts of white sticky/frothy sputum. Pt states that when speaking, she would at times begin sporadically coughing or feel the need to catch her breath. Patient states that she became unable to sleep at night due to being awaken roughly every two hours with the sensation of being unable to breathe. Pt also noted that two days ago her sternal incision became increasingly sensitive to touch with mild redness around the site. Pt denies prior occurrences of symptoms. Pt denied any fevers or hemoptysis. On 1/24 patient got CT chest via [facility] chest which showed large right pleural effusion of the right lower lobe.   Pt admitted to  on 1/25. Bedside right PTC placed for effusion. CXR improved.   PLAN  Neuro: Pain management  Pulm: Encourage coughing, deep breathing and use of incentive spirometry.. Daily CXR.   Cardio: Monitor telemetry/alarms  GI: Tolerating diet. Continue stool softeners. Will place on IVF for added hydration. Per DR. Mcqueen, on outpt CT scan, lesions on liver noted. Will order MRI on Abd to further assess.   Renal: monitor urine output, supplement electrolytes as needed  Vasc: Heparin SC/SCDs for DVT prophylaxis  Heme: Stable H/H. .   ID: Off antibiotics. Stable.  Therapy: OOB/ambulate  Tubes: Monitor Chest tube output, keep to suction.   Disposition: Aim to D/C to home once PTC removed.   Discussed with Cardiothoracic Team at AM rounds.

## 2023-01-26 NOTE — DISCHARGE NOTE PROVIDER - HOSPITAL COURSE
This 62 year old female, who underwent a Sternectomy & thymectomy with B/L pectoralis advancement flaps on 1/24/23 . After discharge, she had trouble sleeping at night due to the sensation of being unable to breathe. Additionally, her sternal incision became increasingly sensitive to touch with mild erythema. On 1/24/23, a CT chest at [facility]  revealed a large right pleural effusion,  She was admitted to [facility] for a pigtail catheter which was placed on 1/25.  She was for discharge home after the tube was removed. This 62 year old female, who underwent a Sternectomy & thymectomy with B/L pectoralis advancement flaps on 1/24/23 . After discharge, she had trouble sleeping at night due to the sensation of being unable to breathe. Additionally, her sternal incision became increasingly sensitive to touch with mild erythema. On 1/24/23, a CT chest at [facility]  revealed a large right pleural effusion,  She was admitted to [facility] for a pigtail catheter which was placed on 1/25.  On 1/28 her right pleural tube was removed and follow up CXR stable. Patient cleared for discharge. Of note, on CT scan it was noted patient has a liver abnormality This 62 year old female, who underwent a Sternectomy & thymectomy with B/L pectoralis advancement flaps on 1/24/23 . After discharge, she had trouble sleeping at night due to the sensation of being unable to breathe. Additionally, her sternal incision became increasingly sensitive to touch with mild erythema. On 1/24/23, a CT chest at [facility]  revealed a large right pleural effusion,  She was admitted to [facility] for a pigtail catheter which was placed on 1/25.  On 1/28 her right pleural tube was removed and follow up CXR stable. Patient cleared for discharge. Of note, on CT scan it was noted patient has a liver abnormality for  which Dr Mcqueen recommeds patient obatin an MRI of liver, a script will be provided for patient to undergo imaging in outpatient setting. This 62 year old female, who underwent a Sternectomy & thymectomy with B/L pectoralis advancement flaps on 1/24/23 . After discharge, she had trouble sleeping at night due to the sensation of being unable to breathe. Additionally, her sternal incision became increasingly sensitive to touch with mild erythema. On 1/24/23, a CT chest at [facility]  revealed a large right pleural effusion,  She was admitted to [facility] for a pigtail catheter which was placed on 1/25.  On 1/28 her right pleural tube was removed and follow up CXR stable. Patient cleared for discharge. Of note, on CT scan it was noted patient has a liver abnormality for  which Dr Mcqueen recommeds patient obatin an MRI of liver, a script will be provided for patient to undergo imaging in outpatient setting. Mandarin  used today for instructions.     ICU Vital Signs Last 24 Hrs  T(C): 36.3 (28 Jan 2023 12:00), Max: 37.2 (28 Jan 2023 04:05)  T(F): 97.3 (28 Jan 2023 12:00), Max: 98.9 (28 Jan 2023 04:05)  HR: 96 (28 Jan 2023 12:00) (87 - 99)  BP: 107/81 (28 Jan 2023 12:00) (97/63 - 114/69)  BP(mean): --  ABP: --  ABP(mean): --  RR: 18 (28 Jan 2023 12:00) (18 - 18)  SpO2: 99% (28 Jan 2023 12:00) (92% - 100%)    O2 Parameters below as of 28 Jan 2023 12:00  Patient On (Oxygen Delivery Method): room air      Neuro: A+O x 3, non-focal, speech clear and intact  CV: regular rate, regular rhythm  Pulm/chest: lung sounds CTA and equal bilaterally, no accessory muscle use noted  Skin: warm, well perfused, no rashes

## 2023-01-27 PROCEDURE — 71045 X-RAY EXAM CHEST 1 VIEW: CPT | Mod: 26

## 2023-01-27 PROCEDURE — 71045 X-RAY EXAM CHEST 1 VIEW: CPT | Mod: 26,77

## 2023-01-27 RX ORDER — IPRATROPIUM/ALBUTEROL SULFATE 18-103MCG
3 AEROSOL WITH ADAPTER (GRAM) INHALATION EVERY 6 HOURS
Refills: 0 | Status: DISCONTINUED | OUTPATIENT
Start: 2023-01-27 | End: 2023-01-28

## 2023-01-27 RX ADMIN — HEPARIN SODIUM 5000 UNIT(S): 5000 INJECTION INTRAVENOUS; SUBCUTANEOUS at 00:15

## 2023-01-27 RX ADMIN — Medication 650 MILLIGRAM(S): at 17:31

## 2023-01-27 RX ADMIN — OXYCODONE HYDROCHLORIDE 5 MILLIGRAM(S): 5 TABLET ORAL at 21:20

## 2023-01-27 RX ADMIN — Medication 3 MILLILITER(S): at 16:58

## 2023-01-27 RX ADMIN — HEPARIN SODIUM 5000 UNIT(S): 5000 INJECTION INTRAVENOUS; SUBCUTANEOUS at 14:08

## 2023-01-27 RX ADMIN — Medication 650 MILLIGRAM(S): at 11:32

## 2023-01-27 RX ADMIN — OXYCODONE HYDROCHLORIDE 5 MILLIGRAM(S): 5 TABLET ORAL at 21:50

## 2023-01-27 RX ADMIN — OXYCODONE HYDROCHLORIDE 5 MILLIGRAM(S): 5 TABLET ORAL at 10:13

## 2023-01-27 RX ADMIN — SENNA PLUS 2 TABLET(S): 8.6 TABLET ORAL at 00:17

## 2023-01-27 RX ADMIN — HEPARIN SODIUM 5000 UNIT(S): 5000 INJECTION INTRAVENOUS; SUBCUTANEOUS at 21:21

## 2023-01-27 RX ADMIN — HEPARIN SODIUM 5000 UNIT(S): 5000 INJECTION INTRAVENOUS; SUBCUTANEOUS at 05:47

## 2023-01-27 RX ADMIN — FAMOTIDINE 20 MILLIGRAM(S): 10 INJECTION INTRAVENOUS at 11:24

## 2023-01-27 RX ADMIN — Medication 3 MILLILITER(S): at 22:03

## 2023-01-27 NOTE — PROGRESS NOTE ADULT - SUBJECTIVE AND OBJECTIVE BOX
Subjective: Assessment done using  services in Mandarin. Pt states she feel that her phlegm is getting "stuck" and she can't cough it all the way up. Asking for meds to help that. States also some pain at PTC site. Does not feel her SOB much changed since admission. OOB and     Vital Signs:  Vital Signs Last 24 Hrs  T(C): 36.9 (01-27-23 @ 11:52), Max: 37.1 (01-26-23 @ 21:24)  T(F): 98.5 (01-27-23 @ 11:52), Max: 98.7 (01-26-23 @ 21:24)  HR: 95 (01-27-23 @ 11:52) (88 - 96)  BP: 98/73 (01-27-23 @ 11:52) (96/71 - 130/79)  RR: 18 (01-27-23 @ 11:52) (18 - 19)  SpO2: 99% (01-27-23 @ 11:52) (96% - 99%) on (O2)    Telemetry/Alarms:  General: WN/WD NAD  Neurology: Awake, nonfocal, AREVALO x 4  Eyes: Scleras clear, PERRLA/ EOMI, Gross vision intact  ENT:Gross hearing intact, grossly patent pharynx, no stridor  Neck: Neck supple, trachea midline, No JVD,   Respiratory: CTA B/L, No wheezing, rales, rhonchi  CV: RRR, S1S2, no murmurs, rubs or gallops  Abdominal: Soft, NT, ND +BS,   Extremities: No edema, + peripheral pulses  Skin: No Rashes, Hematoma, Ecchymosis  Lymphatic: No Neck, axilla, groin LAD  Psych: Oriented x 3, normal affect  Incisions:   Tubes:  Relevant labs, radiology and Medications reviewed                        11.7 5.19  )-----------( 340      ( 26 Jan 2023 06:55 )             35.7     01-26    137  |  100  |  10  ----------------------------<  108<H>  3.9   |  26  |  0.52    Ca    9.1      26 Jan 2023 06:55  Phos  3.9     01-26  Mg     2.30     01-26    TPro  7.4  /  Alb  3.7  /  TBili  0.3  /  DBili  x   /  AST  35<H>  /  ALT  12  /  AlkPhos  75  01-25    PT/INR - ( 25 Jan 2023 13:36 )   PT: 12.7 sec;   INR: 1.09 ratio         PTT - ( 25 Jan 2023 13:36 )  PTT:28.2 sec  MEDICATIONS  (STANDING):  albuterol/ipratropium for Nebulization 3 milliLiter(s) Nebulizer every 6 hours  famotidine    Tablet 20 milliGRAM(s) Oral daily  heparin   Injectable 5000 Unit(s) SubCutaneous every 8 hours  polyethylene glycol 3350 17 Gram(s) Oral daily  senna 2 Tablet(s) Oral at bedtime    MEDICATIONS  (PRN):  acetaminophen     Tablet .. 650 milliGRAM(s) Oral every 6 hours PRN Temp greater or equal to 38C (100.4F), Mild Pain (1 - 3)  bisacodyl 5 milliGRAM(s) Oral every 12 hours PRN Constipation  oxyCODONE    IR 5 milliGRAM(s) Oral every 3 hours PRN Moderate Pain (4 - 6)  oxyCODONE    IR 7.5 milliGRAM(s) Oral every 3 hours PRN Severe Pain (7 - 10)    Pertinent Physical Exam  I&O's Summary    26 Jan 2023 07:01  -  27 Jan 2023 07:00  --------------------------------------------------------  IN: 1160 mL / OUT: 700 mL / NET: 460 mL    27 Jan 2023 07:01  -  27 Jan 2023 13:13  --------------------------------------------------------  IN: 400 mL / OUT: 300 mL / NET: 100 mL      Marital Status:  (   )    (   ) Single    (   )    (  )   Lives with: (  ) alone  (  ) children   (  ) spouse   (  ) parents  (  ) other  Recent Travel: No recent travel  Occupation:    Substance Use (street drugs): ( x ) never used  (  ) other:  Tobacco Usage:  ( x  ) never smoked   (   ) former smoker   (   ) current smoker  (     ) pack year  Alcohol Usage: None     Assessment  62y Female  w/ PAST MEDICAL & SURGICAL HISTORY:  Mediastinal mass      Thymic cancer      S/P appendectomy  2003      History of lung biopsy      admitted with complaints of Patient is a 62y old  Female who presents with a chief complaint of Shortness of breath (26 Jan 2023 19:28)  .  On (Date), patient underwent Insertion, pigtail catheter, pleural    . Postoperative course/issues:    PLAN  Neuro: Pain management  Pulm: Encourage coughing, deep breathing and use of incentive spirometry. Wean off supplemental oxygen as able. Daily CXR.   Cardio: Monitor telemetry/alarms  GI: Tolerating diet. Continue stool softeners.  Renal: monitor urine output, supplement electrolytes as needed  Vasc: Heparin SC/SCDs for DVT prophylaxis  Heme: Stable H/H. .   ID: Off antibiotics. Stable.  Therapy: OOB/ambulate  Tubes: Monitor Chest tube output  Disposition: Aim to D/C to home on  Discussed with Cardiothoracic Team at AM rounds.      Subjective: Assessment done using  services in Mandarin. Pt states she feel that her phlegm is getting "stuck" and she can't cough it all the way up. Asking for meds to help that. States also some pain at PTC site. Does not feel her SOB much changed since admission. OOB and amb to BR.     Vital Signs:  Vital Signs Last 24 Hrs  T(C): 36.9 (01-27-23 @ 11:52), Max: 37.1 (01-26-23 @ 21:24)  T(F): 98.5 (01-27-23 @ 11:52), Max: 98.7 (01-26-23 @ 21:24)  HR: 95 (01-27-23 @ 11:52) (88 - 96)  BP: 98/73 (01-27-23 @ 11:52) (96/71 - 130/79)  RR: 18 (01-27-23 @ 11:52) (18 - 19)  SpO2: 99% (01-27-23 @ 11:52) (96% - 99%) on (O2)    Telemetry/Alarms:  General: WN/WD NAD  Neurology: Awake, nonfocal, AREVALO x 4  Eyes: Scleras clear, PERRLA/ EOMI, Gross vision intact  ENT:Gross hearing intact, grossly patent pharynx, no stridor  Neck: Neck supple, trachea midline, No JVD,   Respiratory: Dec'd BS to rt side.   CV: RRR, S1S2, no murmurs, rubs or gallops  Abdominal: Soft, NT, ND +BS, sml BM yester  Extremities: No edema, + peripheral pulses  Skin: No Rashes, Hematoma, Ecchymosis  Lymphatic: No Neck, axilla, groin LAD  Psych: Oriented x 3, normal affect  Incisions: Sternal incision c/d/i, stable.   Tubes: Rt PTC- 0cc/24hrs on Sxn, no AL. Put to waterseal this am.   Relevant labs, radiology and Medications reviewed           CXR- decreased effusion, stable space.              11.7   5.19  )-----------( 340      ( 26 Jan 2023 06:55 )             35.7     01-26    137  |  100  |  10  ----------------------------<  108<H>  3.9   |  26  |  0.52    Ca    9.1      26 Jan 2023 06:55  Phos  3.9     01-26  Mg     2.30     01-26    TPro  7.4  /  Alb  3.7  /  TBili  0.3  /  DBili  x   /  AST  35<H>  /  ALT  12  /  AlkPhos  75  01-25    PT/INR - ( 25 Jan 2023 13:36 )   PT: 12.7 sec;   INR: 1.09 ratio         PTT - ( 25 Jan 2023 13:36 )  PTT:28.2 sec  MEDICATIONS  (STANDING):  albuterol/ipratropium for Nebulization 3 milliLiter(s) Nebulizer every 6 hours  famotidine    Tablet 20 milliGRAM(s) Oral daily  heparin   Injectable 5000 Unit(s) SubCutaneous every 8 hours  polyethylene glycol 3350 17 Gram(s) Oral daily  senna 2 Tablet(s) Oral at bedtime    MEDICATIONS  (PRN):  acetaminophen     Tablet .. 650 milliGRAM(s) Oral every 6 hours PRN Temp greater or equal to 38C (100.4F), Mild Pain (1 - 3)  bisacodyl 5 milliGRAM(s) Oral every 12 hours PRN Constipation  oxyCODONE    IR 5 milliGRAM(s) Oral every 3 hours PRN Moderate Pain (4 - 6)  oxyCODONE    IR 7.5 milliGRAM(s) Oral every 3 hours PRN Severe Pain (7 - 10)    Pertinent Physical Exam  I&O's Summary    26 Jan 2023 07:01  -  27 Jan 2023 07:00  --------------------------------------------------------  IN: 1160 mL / OUT: 700 mL / NET: 460 mL    27 Jan 2023 07:01  -  27 Jan 2023 13:13  --------------------------------------------------------  IN: 400 mL / OUT: 300 mL / NET: 100 mL        Assessment  62y Female  w/ PAST MEDICAL & SURGICAL HISTORY:  Mediastinal mass      Thymic cancer      S/P appendectomy  2003      History of lung biopsy      admitted with complaints of Patient is a 62y old  Female who presents with a chief complaint of Shortness of breath (26 Jan 2023 19:28)  .    Ms. Mack Oro, 62 year old female, never a smoker, with w/ PMHx anterior mediastinal mass presents complaining of shortness of breath. Pt is s/p FNA of anterior mediastinal mass on 7/8/22. Path revealed positive for malignant cells, consistent with a Thymic Squamous Cell Carcinoma. Pt has completed 8 cycles of chemo, last dose 9/20/22. Now s/p sternectomy, En bloc resection of partial right second, third, and fourth ribs, Radical excision of thymic carcinoma with total thymectomy, En bloc wedge resection of the portion of right upper lobe and right middle lobe of the lung, Subtotal pericardectomy, Reconstruction using composite mesh and methyl methacrylate on 1/3/23. Pt states that five days ago she began experiencing SOB with right sided chest pain. Pt states that she has infrequently been coughing up small amounts of white sticky/frothy sputum. Pt states that when speaking, she would at times begin sporadically coughing or feel the need to catch her breath. Patient states that she became unable to sleep at night due to being awaken roughly every two hours with the sensation of being unable to breathe. Pt also noted that two days ago her sternal incision became increasingly sensitive to touch with mild redness around the site. Pt denies prior occurrences of symptoms. Pt denied any fevers or hemoptysis. On 1/24 patient got CT chest via [facility] chest which showed large right pleural effusion of the right lower lobe.   Pt admitted to  on 1/25. Bedside right PTC placed for effusion. CXR improved. MRI pending to eval for liver mets.     PLAN  Neuro: Pain management  Pulm: Encourage coughing, deep breathing and use of incentive spirometry. Wean off supplemental oxygen as able. Daily CXR.   Cardio: Monitor telemetry/alarms  GI: Tolerating diet. Continue stool softeners.  Renal: monitor urine output, supplement electrolytes as needed  Vasc: Heparin SC/SCDs for DVT prophylaxis  Heme: Stable H/H. .   ID: Off antibiotics. Stable.  Therapy: OOB/ambulate  Tubes: Monitor Chest tube output  Disposition: Aim to D/C to home on  Discussed with Cardiothoracic Team at AM rounds.      Subjective: Assessment done using  services in Mandarin. Pt states she feel that her phlegm is getting "stuck" and she can't cough it all the way up. Asking for meds to help that. States also some pain at PTC site. Does not feel her SOB much changed since admission. OOB and amb to BR.     Vital Signs:  Vital Signs Last 24 Hrs  T(C): 36.9 (01-27-23 @ 11:52), Max: 37.1 (01-26-23 @ 21:24)  T(F): 98.5 (01-27-23 @ 11:52), Max: 98.7 (01-26-23 @ 21:24)  HR: 95 (01-27-23 @ 11:52) (88 - 96)  BP: 98/73 (01-27-23 @ 11:52) (96/71 - 130/79)  RR: 18 (01-27-23 @ 11:52) (18 - 19)  SpO2: 99% (01-27-23 @ 11:52) (96% - 99%) on (O2)    Telemetry/Alarms:  General: WN/WD NAD  Neurology: Awake, nonfocal, AREVALO x 4  Eyes: Scleras clear, PERRLA/ EOMI, Gross vision intact  ENT:Gross hearing intact, grossly patent pharynx, no stridor  Neck: Neck supple, trachea midline, No JVD,   Respiratory: Dec'd BS to rt side.   CV: RRR, S1S2, no murmurs, rubs or gallops  Abdominal: Soft, NT, ND +BS, sml BM yester  Extremities: No edema, + peripheral pulses  Skin: No Rashes, Hematoma, Ecchymosis  Lymphatic: No Neck, axilla, groin LAD  Psych: Oriented x 3, normal affect  Incisions: Sternal incision c/d/i, stable.   Tubes: Rt PTC- 0cc/24hrs on Sxn, no AL. Put to waterseal this am.   Relevant labs, radiology and Medications reviewed           CXR- decreased effusion, stable space.              11.7   5.19  )-----------( 340      ( 26 Jan 2023 06:55 )             35.7     01-26    137  |  100  |  10  ----------------------------<  108<H>  3.9   |  26  |  0.52    Ca    9.1      26 Jan 2023 06:55  Phos  3.9     01-26  Mg     2.30     01-26    TPro  7.4  /  Alb  3.7  /  TBili  0.3  /  DBili  x   /  AST  35<H>  /  ALT  12  /  AlkPhos  75  01-25    PT/INR - ( 25 Jan 2023 13:36 )   PT: 12.7 sec;   INR: 1.09 ratio         PTT - ( 25 Jan 2023 13:36 )  PTT:28.2 sec  MEDICATIONS  (STANDING):  albuterol/ipratropium for Nebulization 3 milliLiter(s) Nebulizer every 6 hours  famotidine    Tablet 20 milliGRAM(s) Oral daily  heparin   Injectable 5000 Unit(s) SubCutaneous every 8 hours  polyethylene glycol 3350 17 Gram(s) Oral daily  senna 2 Tablet(s) Oral at bedtime    MEDICATIONS  (PRN):  acetaminophen     Tablet .. 650 milliGRAM(s) Oral every 6 hours PRN Temp greater or equal to 38C (100.4F), Mild Pain (1 - 3)  bisacodyl 5 milliGRAM(s) Oral every 12 hours PRN Constipation  oxyCODONE    IR 5 milliGRAM(s) Oral every 3 hours PRN Moderate Pain (4 - 6)  oxyCODONE    IR 7.5 milliGRAM(s) Oral every 3 hours PRN Severe Pain (7 - 10)    Pertinent Physical Exam  I&O's Summary    26 Jan 2023 07:01  -  27 Jan 2023 07:00  --------------------------------------------------------  IN: 1160 mL / OUT: 700 mL / NET: 460 mL    27 Jan 2023 07:01  -  27 Jan 2023 13:13  --------------------------------------------------------  IN: 400 mL / OUT: 300 mL / NET: 100 mL        Assessment  62y Female  w/ PAST MEDICAL & SURGICAL HISTORY:  Mediastinal mass      Thymic cancer      S/P appendectomy  2003      History of lung biopsy      admitted with complaints of Patient is a 62y old  Female who presents with a chief complaint of Shortness of breath (26 Jan 2023 19:28)  .    Ms. Mack Oro, 62 year old female, never a smoker, with w/ PMHx anterior mediastinal mass presents complaining of shortness of breath. Pt is s/p FNA of anterior mediastinal mass on 7/8/22. Path revealed positive for malignant cells, consistent with a Thymic Squamous Cell Carcinoma. Pt has completed 8 cycles of chemo, last dose 9/20/22. Now s/p sternectomy, En bloc resection of partial right second, third, and fourth ribs, Radical excision of thymic carcinoma with total thymectomy, En bloc wedge resection of the portion of right upper lobe and right middle lobe of the lung, Subtotal pericardectomy, Reconstruction using composite mesh and methyl methacrylate on 1/3/23. Pt states that five days ago she began experiencing SOB with right sided chest pain. Pt states that she has infrequently been coughing up small amounts of white sticky/frothy sputum. Pt states that when speaking, she would at times begin sporadically coughing or feel the need to catch her breath. Patient states that she became unable to sleep at night due to being awaken roughly every two hours with the sensation of being unable to breathe. Pt also noted that two days ago her sternal incision became increasingly sensitive to touch with mild redness around the site. Pt denies prior occurrences of symptoms. Pt denied any fevers or hemoptysis. On 1/24 patient got CT chest via [facility] chest which showed large right pleural effusion of the right lower lobe.   Pt admitted to  on 1/25. Bedside right PTC placed for effusion. CXR improved. MRI pending to eval for liver mets.     PLAN  Neuro: Pain management. Continue current regiment  Pulm: Encourage coughing, deep breathing and use of incentive spirometry.  Daily CXR. Will add Duoneb to help for mucus expectoration.   Cardio: Monitor telemetry/alarms  GI: Tolerating diet. Continue stool softeners. Awaiting MRI of Abd.   Renal: monitor urine output, supplement electrolytes as needed  Vasc: Heparin SC/SCDs for DVT prophylaxis  Heme: Stable H/H. .   ID: Off antibiotics. Stable.  Therapy: OOB/ambulate  Tubes: Monitor Chest tube output, place to waterseal. FU CXR shows no significant change  Disposition: Aim to D/C to home in next 24hrs.   Discussed with Cardiothoracic Team at AM rounds.

## 2023-01-28 ENCOUNTER — TRANSCRIPTION ENCOUNTER (OUTPATIENT)
Age: 63
End: 2023-01-28

## 2023-01-28 VITALS
HEART RATE: 96 BPM | SYSTOLIC BLOOD PRESSURE: 107 MMHG | RESPIRATION RATE: 18 BRPM | TEMPERATURE: 97 F | DIASTOLIC BLOOD PRESSURE: 81 MMHG | OXYGEN SATURATION: 99 %

## 2023-01-28 PROCEDURE — 71045 X-RAY EXAM CHEST 1 VIEW: CPT | Mod: 26,76

## 2023-01-28 RX ADMIN — Medication 650 MILLIGRAM(S): at 16:30

## 2023-01-28 RX ADMIN — Medication 3 MILLILITER(S): at 04:12

## 2023-01-28 RX ADMIN — FAMOTIDINE 20 MILLIGRAM(S): 10 INJECTION INTRAVENOUS at 11:17

## 2023-01-28 RX ADMIN — Medication 3 MILLILITER(S): at 09:32

## 2023-01-28 RX ADMIN — OXYCODONE HYDROCHLORIDE 5 MILLIGRAM(S): 5 TABLET ORAL at 06:20

## 2023-01-28 RX ADMIN — POLYETHYLENE GLYCOL 3350 17 GRAM(S): 17 POWDER, FOR SOLUTION ORAL at 11:18

## 2023-01-28 RX ADMIN — Medication 650 MILLIGRAM(S): at 16:08

## 2023-01-28 RX ADMIN — Medication 650 MILLIGRAM(S): at 06:48

## 2023-01-28 RX ADMIN — HEPARIN SODIUM 5000 UNIT(S): 5000 INJECTION INTRAVENOUS; SUBCUTANEOUS at 05:50

## 2023-01-28 RX ADMIN — OXYCODONE HYDROCHLORIDE 5 MILLIGRAM(S): 5 TABLET ORAL at 05:50

## 2023-01-28 NOTE — DISCHARGE NOTE NURSING/CASE MANAGEMENT/SOCIAL WORK - PATIENT PORTAL LINK FT
You can access the FollowMyHealth Patient Portal offered by [facility] by registering at the following website: [email or web address]. By joining [employer]’s FollowMyHealth portal, you will also be able to view your health information using other applications (apps) compatible with our system.

## 2023-01-28 NOTE — DISCHARGE NOTE NURSING/CASE MANAGEMENT/SOCIAL WORK - NSDCFUADDAPPT_GEN_ALL_CORE_FT
See Dr Mcqueen in 2 weeks- call for an appointment and bring a new chest X-ray with you when you come.      Please go to any radiology imaging place near you and obtain an MRI of abdomen. Script has been provided to you.

## 2023-01-28 NOTE — DISCHARGE NOTE NURSING/CASE MANAGEMENT/SOCIAL WORK - NSDCPEFALRISK_GEN_ALL_CORE
For information on Fall & Injury Prevention, visit: [email or web address] OR  [email or web address] OR  https://www.cdc.gov/steadi/patient.html

## 2023-01-30 LAB
CULTURE RESULTS: SIGNIFICANT CHANGE UP
NON-GYNECOLOGICAL CYTOLOGY STUDY: SIGNIFICANT CHANGE UP
SPECIMEN SOURCE: SIGNIFICANT CHANGE UP

## 2023-02-16 ENCOUNTER — APPOINTMENT (OUTPATIENT)
Dept: THORACIC SURGERY | Facility: CLINIC | Age: 63
End: 2023-02-16
Payer: MEDICAID

## 2023-02-16 VITALS
RESPIRATION RATE: 18 BRPM | TEMPERATURE: 97.5 F | OXYGEN SATURATION: 97 % | SYSTOLIC BLOOD PRESSURE: 127 MMHG | BODY MASS INDEX: 22.86 KG/M2 | HEART RATE: 95 BPM | WEIGHT: 125 LBS | DIASTOLIC BLOOD PRESSURE: 84 MMHG

## 2023-02-16 DIAGNOSIS — C37 MALIGNANT NEOPLASM OF THYMUS: ICD-10-CM

## 2023-02-16 DIAGNOSIS — D49.89 NEOPLASM OF UNSPECIFIED BEHAVIOR OF OTHER SPECIFIED SITES: ICD-10-CM

## 2023-02-16 DIAGNOSIS — K76.9 LIVER DISEASE, UNSPECIFIED: ICD-10-CM

## 2023-02-16 DIAGNOSIS — R59.0 LOCALIZED ENLARGED LYMPH NODES: ICD-10-CM

## 2023-02-16 PROCEDURE — 99024 POSTOP FOLLOW-UP VISIT: CPT

## 2023-02-17 NOTE — CONSULT LETTER
[FreeTextEntry2] : Dr. Kb Solano (Pulm/Ref)\par Dr. Haley Richard (PCP) [FreeTextEntry3] : Chandler Mcqueen MD, MPH \par System Director of Thoracic Surgery \par Director of [facility] [place] \par Professor Cardiovascular & Thoracic Surgery \par [employer] at [facility]\par \par

## 2023-02-17 NOTE — ASSESSMENT
[FreeTextEntry1] : Ms. ALONSO HUITRON, 62 year old female, never a smoker, with no significant PMHX, who c/o middle chest pain radiated to right lateral chest since 09/2021, had CXR x2, findings were unremarkable. CT chest on 05/07/2022 showed anterior mediastinal mass. \par \par Now s/p FNA of anterior mediastinal mass on 7/8/22. Path revealed positive for malignant cells, carcinoma, favor high grade with marked apoptosis. Immunostains: Positive for P40, CD5, [id number] and show an elevated\par Ki67 index. 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 Joe Barraza for chemotherapy, completed 8 cycles of chemo, last dose 9/20/22. She has completed neoadjuvant RT with Dr. Conenr Walker in 9/2022 at [facility]. \par \par Now 6wk s/p sternectomy, En bloc resection of partial right second, third, and fourth ribs, Radical excision of thymic carcinoma with total thymectomy, En bloc wedge resection of the portion of right upper lobe and right middle lobe of the lung, Subtotal pericardectomy, Reconstruction using composite mesh and methyl methacrylate on 1/3/23. Path revealed residual thymic carcinoma, squamous cell type, multiple separate microscopic foci, the largest 5 mm, all margins are negative, [id number]\par \par CT Chest w/o contrast on 1/24/23 at [facility]:\par - large Rt sided pleural effusion\par - at least 2 Lt hepatic masses measuring 4.3cm and 2.7cm, new\par \par Patient was instructed to go to [facility] ED.\par Now s/p Rt pigtail drainage on 1/25/23. Path of pleural fluid negative for malignancy.\par \par MRI Liver w/w/o contrast on 2/10/23 at [facility]:\par - multiple new solid hepatic masses: 4.9cm (segment 4), 3.1cm (segment 2 & 4), 1.6cm (segment 2), 8mm (segment 3), c/w mets\par - a 6mm cystic pancreatic lesion\par \par I have reviewed the patient's medical records and diagnostic images at time of this office consultation and have made the following recommendation:\par 1. Recommended patient to f/u with Hem/Onc Dr. Barraza for chemo for possible liver mets, clinical Stg IV\par 2. RTC as needed.\par \par \par I, Dr. COFFMAN, TAPAN CASTILLO, personally performed the evaluation and management (E/M) services for this established patient who presents today with (a) new problem(s)/exacerbation of (an) existing condition(s). That E/M includes conducting the examination, assessing all new/exacerbated conditions, and establishing a new plan of care. Today, my ACP, Hannah Villareal NP was here to observe my evaluation and management services for this new problem/exacerbated condition to be followed going forward.\par \par \par \par

## 2023-02-17 NOTE — HISTORY OF PRESENT ILLNESS
[FreeTextEntry1] : Ms. ALONSO HUITRON, 62 year old female, never a smoker, with no significant PMHX, who c/o middle chest pain radiated to right lateral chest since 09/2021, had CXR x2, findings were unremarkable. CT chest on 05/07/2022 showed anterior mediastinal mass. \par \par CT chest on 05/07/2022:\par - 4.0 x 2.8 x 4.1 cm (2: 60 and 6: 71) solid right anterior mediastinal soft tissue mass containing few punctate internal calcifications\par - calcified right hilar nodules present. \par - a 3 mm DOUG nodule (3: 74)\par - a 3 mm calcified granuloma in RUL (3: 26)\par - 4 mm calcified LL granuloma (3: 97)\par - a 4 mm RLL subpleural nodule (3: 104), tiny internal fat\par \par PET/CT on 6/13/22:\par - large active mass in the anterior mediastinum 5.1 x 3.2 cm, SUV=12.7, activity in the adjacent aspect of the sternum, SUV=6.7\par \par CT Chest w/IV contrast on 6/13/22:\par - anterior inferior mediastinum is a solid lobulated mass, 5.6 x 4.1 x 2.6 cm, the mass indents the anterior aspect of the Rt atrium\par - subpleural 4 mm solid nodule in RLL (3:106)\par - stable 3 and 4 mm nodules abutting minor fissure (3:89, 94)\par \par Now s/p FNA of anterior mediastinal mass on 7/8/22. Path revealed positive for malignant cells, carcinoma, favor high grade with marked apoptosis. Immunostains: Positive for P40, CD5, [id number] and show an elevated\par Ki67 index. 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 Joe Barraza for chemotherapy, completed 8 cycles of chemo, last dose 9/20/22. She has completed neoadjuvant RT with Dr. Conner Walker in 9/2022 at [facility]. \par \par CT C/A/P w/ contrast on 10/5/22:\par - decreased in size 3.2 x 2.1 x 3.9cm anterior mediastinal mass abutting the pericardium (2:59; previously 4.2 x 2.6 x 5.6cm)\par - stable 1cm Rt hilar LN\par - few subcentimeter mediastinal LNs\par - 5mm RLL nodule (3:101); small 4mm DOUG nodule (3:74); small 4mm RML nodule (3:92)\par - small 3mm calcified granuloma in the RUL\par \par PFTs on 10/15/22: FVC 98%, FEV1 96%, DLCO 77%.\par \par PET/CT on 10/27/22 @ [facility]:\par - decreased in size and activity of the anterior mediastinal mass, measuring 3.2 x 2.1cm SUV=3 now (image 63), previously 5.2 x 3.2cm SUV=12.7, contact the Rt pleural space as well as the anterior pericardium w/ slight mild mass effect on the Rt atrium\par - decreased activity w/in the adjacent sternum, now w/ SUV=3.6 (image 64), previously SUV=6.7\par - bilateral hilar LNs measuring up to 1cm in the Rt with SUV=5 (image 57; previously SUV=5.1)\par - several lung nodules bilaterally, measuring up to 5mm in the RML (image 65); 5mm in the RLL (image 66)\par - a new 6mm RUL ggo (image 51)\par \par Now s/p Flex Bronch, EBUS Bx on 11/29/22. Path of Lvl 7 LN negative for malignancy; R4 LN non diagnostic; R11 LN non diagnostic.\par \par Instructed patient to consult with Dr. Jj Roger for possible joint case: Resection of anterior mediastinal mass, rxn of sternum and reconstruction. She saw Dr. Roger on 12/7/2022. \par \par Now 6 wk s/p sternectomy, En bloc resection of partial right second, third, and fourth ribs, Radical excision of thymic carcinoma with total thymectomy, En bloc wedge resection of the portion of right upper lobe and right middle lobe of the lung, Subtotal pericardectomy, Reconstruction using composite mesh and methyl methacrylate on 1/3/23. Path revealed residual thymic carcinoma, squamous cell type, multiple separate microscopic foci, the largest 5 mm, all margins are negative, [id number]\par \par CXR on 1/19/2023 showed small Rt sided pleural effusion.\par \par I had recommended patient to f/u with Dr. Barraza for consultation, no appointment yet.\par \par CT Chest w/o contrast on 1/24/23 at [facility]:\par - large Rt sided pleural effusion\par - at least 2 Lt hepatic masses measuring 4.3cm and 2.7cm, new\par \par Patient was instructed to go to [facility] ED.\par Now s/p Rt pigtail drainage on 1/25/23. Path of pleural fluid negative for malignancy.\par \par MRI Liver w/w/o contrast on 2/10/23 at [facility]:\par - multiple new solid hepatic masses: 4.9cm (segment 4), 3.1cm (segment 2 & 4), 1.6cm (segment 2), 8mm (segment 3), c/w mets\par - a 6mm cystic pancreatic lesion\par \par CXR today -- reviewed.\par \par Patient is here today for a follow up. Admits to severe increased sensation to Rt anterior chest wall, post-op and also post-Shingles neuropathy.\par

## 2023-02-17 NOTE — DATA REVIEWED
[FreeTextEntry1] : I have independently reviewed the following:\par MRI Liver w/w/o contrast on 2/10/23 at [facility]\par CXR today

## 2023-03-03 ENCOUNTER — NON-APPOINTMENT (OUTPATIENT)
Age: 63
End: 2023-03-03

## 2023-09-21 NOTE — DISCHARGE NOTE NURSING/CASE MANAGEMENT/SOCIAL WORK - NSTRANSFERBELONGINGSDISPO_GEN_A_NUR
with patient

## 2024-08-30 NOTE — ASU PATIENT PROFILE, ADULT - NS TRANSFER EYEGLASSES PAIRS
What Type Of Note Output Would You Prefer (Optional)?: Bullet Format How Severe Is Your Rash?: mild Is This A New Presentation, Or A Follow-Up?: Rash none

## 2025-05-15 NOTE — H&P PST ADULT - ASSESSMENT
Review of systems  Gen: No weight changes, fatigue, fevers/chills, weakness  Skin: No rashes  Head/Eyes/Ears/Mouth: No headache; Normal hearing; Normal vision w/o blurriness; No sinus pain/discomfort, sore throat  Respiratory: No dyspnea, cough, wheezing, hemoptysis  CV: No chest pain, PND, orthopnea  GI: No abdominal pain; denies diarrhea, constipation, nausea, vomiting, melena, hematochezia  : No increased frequency, dysuria, hematuria, nocturia  MSK: No joint pain/swelling; no back pain; no edema  Neuro: No dizziness/lightheadedness, weakness, seizures, numbness, tingling  Heme: No easy bruising or bleeding  Endo: No heat/cold intolerance  Psych: No significant nervousness, anxiety, stress, depression  All other systems were reviewed and are negative, except as noted.  preop dx of neoplasm of unspecified behavior other specified sites.

## (undated) DEVICE — CONNECTOR CARDIAC 1:1 FOR HUBLESS DRAINS

## (undated) DEVICE — SYR LUER LOK 10CC

## (undated) DEVICE — DRAPE IOBAN 33" X 23"

## (undated) DEVICE — DRAPE INSTRUMENT POUCH 6.75" X 11"

## (undated) DEVICE — ELCTR GROUNDING PAD ADULT COVIDIEN

## (undated) DEVICE — DRAPE LARGE SHEET 72X85"

## (undated) DEVICE — Device

## (undated) DEVICE — CLAMP MICROVASCULAR SINGLE  1-2MM

## (undated) DEVICE — DRAIN JACKSON PRATT 7MM FLAT FULL NO TROCAR

## (undated) DEVICE — PACK MAJOR ABDOMINAL WITH LAP

## (undated) DEVICE — SYR SLIP 10CC

## (undated) DEVICE — PREP BETADINE KIT

## (undated) DEVICE — DRAPE 3/4 SHEET 52X76"

## (undated) DEVICE — DRAPE FLUID WARMER 44 X 44"

## (undated) DEVICE — DRSG XEROFORM 5 X 9"

## (undated) DEVICE — DURABLE MEDICAL EQUIPMENT: Type: DURABLE MEDICAL EQUIPMENT

## (undated) DEVICE — GOWN LG

## (undated) DEVICE — ELCTR BOVIE BLADE 3/4" EXTENDED LENGTH 6"

## (undated) DEVICE — SOL IRR POUR NS 0.9% 500ML

## (undated) DEVICE — ADAPTER FIBEROPTIC BRONCHOSCOPE DUAL AXIS SWIVEL

## (undated) DEVICE — DRSG TELFA 3 X 8

## (undated) DEVICE — LABELS BLANK W PEN

## (undated) DEVICE — SAW BLADE STRYKER STERNUM 31MM X 6.27 X .79

## (undated) DEVICE — SUT MONOCRYL 4-0 27" PS-2 UNDYED

## (undated) DEVICE — NDL ASPIRATION VIZISHOT2 21G

## (undated) DEVICE — FOLEY CATH 2-WAY 16FR 5CC LATEX LUBRICATH

## (undated) DEVICE — BIPOLAR FORCEP CORD WECK STANDARD 12FT

## (undated) DEVICE — DRAPE GENERAL ENDOSCOPY

## (undated) DEVICE — SUT ETHILON 9-0 5" BV100-4

## (undated) DEVICE — TUBING SUCTION NONCONDUCTIVE 6MM X 12FT

## (undated) DEVICE — PREP CHLORAPREP HI-LITE ORANGE 26ML

## (undated) DEVICE — SUT VICRYL PLUS 2-0 18" CT-1 (POP-OFF)

## (undated) DEVICE — VENODYNE/SCD SLEEVE CALF MEDIUM

## (undated) DEVICE — POSITIONER STRAP ARMBOARD VELCRO TS-30

## (undated) DEVICE — FOLEY TRAY 16FR 5CC LF UMETER CLOSED

## (undated) DEVICE — DRAPE IOBAN 23" X 23"

## (undated) DEVICE — DRAPE MAGNETIC INSTRUMENT MEDIUM

## (undated) DEVICE — SUT QUILL MONODERM 2-0 30CM 18MM

## (undated) DEVICE — ELCTR BOVIE TIP BLADE INSULATED 2.75" EDGE

## (undated) DEVICE — DRAPE VARI-LENS2 MICROSCPOPE 68MM

## (undated) DEVICE — WARMING BLANKET LOWER ADULT

## (undated) DEVICE — DRSG STERISTRIPS 0.5 X 4"

## (undated) DEVICE — SYR SAFE TB 1CC 27G X 0.5

## (undated) DEVICE — SOL ANTI FOG

## (undated) DEVICE — MERCIAN VISABILITY BACKROUND YELLOW

## (undated) DEVICE — SUT VICRYL 1 36" CTX UNDYED

## (undated) DEVICE — DRAIN RESERVOIR FOR JACKSON PRATT 100CC CARDINAL

## (undated) DEVICE — DRSG BENZOIN 0.6CC

## (undated) DEVICE — BALLOON SINGLE FOR BF-UC160F

## (undated) DEVICE — DRAPE IRRIGATION POUCH 19X23"

## (undated) DEVICE — CHEST DRAIN PLEUR-EVAC DRY/WET ADULT-PEDS SINGLE (QUICK)

## (undated) DEVICE — SYR LUER LOK 3CC

## (undated) DEVICE — NDL ASPIRATION 22G W SYR

## (undated) DEVICE — TUBING TRUWAVE PRESSURE MALE/FEMALE 12"

## (undated) DEVICE — LIJ/LIA-RENTAL VAC MACHINE VACUUM ASSISTED C: Type: DURABLE MEDICAL EQUIPMENT

## (undated) DEVICE — FRAZIER SUCTION TIP 8FR

## (undated) DEVICE — MARKING PEN W RULER

## (undated) DEVICE — STRYKER MIXEVAC 3 BONE CEMENT MIXER

## (undated) DEVICE — DRAIN JACKSON PRATT 10MM FLAT 3/4 NO TROCAR

## (undated) DEVICE — SOL INJ LR 1000ML

## (undated) DEVICE — VALVE BIOPSY BRONCHOVIDEOSCOPE

## (undated) DEVICE — DRAIN 24 FR ROUND HUBLESS FULL FLUTED SILICONE

## (undated) DEVICE — BLADE STERILIZING

## (undated) DEVICE — SUT ETHILON 2-0 18" FS

## (undated) DEVICE — ELCTR BOVIE PENCIL BLADE 10FT

## (undated) DEVICE — STAPLER COVIDIEN ENDO GIA STANDARD HANDLE

## (undated) DEVICE — POSITIONER FOAM EGG CRATE ULNAR 2PCS (PINK)

## (undated) DEVICE — SUT PDS II 1 96" XLH

## (undated) DEVICE — DRSG CURITY GAUZE SPONGE 4 X 4" 12-PLY

## (undated) DEVICE — ELCTR BOVIE TIP BLADE INSULATED 6.5" EDGE

## (undated) DEVICE — STAPLER SKIN MULTI DIRECTION W35

## (undated) DEVICE — LONE STAR RETRACTOR RING 12MM BLUNT DISP

## (undated) DEVICE — SAW BLADE MICROAIRE STERNUM 1X34X9.4MM

## (undated) DEVICE — SUT VICRYL 3-0 18" PS-2 UNDYED

## (undated) DEVICE — FOLEY TRAY 14FR 5CC LF UMETER CLOSED

## (undated) DEVICE — NDL ASPIRATION VIZISHOT2 19G

## (undated) DEVICE — LAP PAD W RING 18 X 18"

## (undated) DEVICE — LIJ-LERUTE VIDEO MEDIASTINOSCOPY TRAY: Type: DURABLE MEDICAL EQUIPMENT

## (undated) DEVICE — DRSG TEGADERM 4X4.75"

## (undated) DEVICE — PROTECTOR HEEL / ELBOW FLUFFY

## (undated) DEVICE — DRAPE LAPAROTOMY TRANSVERSE

## (undated) DEVICE — SUT MONOCRYL 3-0 27" KS CS-1 UNDYED

## (undated) DEVICE — SENS SMALL PATCH FBR OPTC

## (undated) DEVICE — BASIN SET DOUBLE

## (undated) DEVICE — CLAMP MICROVASCULAR DOUBLE  1-2MM

## (undated) DEVICE — PACK FREE FLAP

## (undated) DEVICE — GLV 8 PROTEXIS (WHITE)

## (undated) DEVICE — SUT VICRYL 0 36" CT-1 UNDYED

## (undated) DEVICE — VALVE SUCTION EVIS 160/200/240

## (undated) DEVICE — SUT ETHILON 3-0 18" FS-1

## (undated) DEVICE — DRSG DERMABOND PRINEO 60CM

## (undated) DEVICE — DRSG DERMABOND 0.7ML

## (undated) DEVICE — SOL IRR POUR NS 0.9% 1000ML